# Patient Record
Sex: MALE | Race: WHITE | NOT HISPANIC OR LATINO | Employment: FULL TIME | URBAN - METROPOLITAN AREA
[De-identification: names, ages, dates, MRNs, and addresses within clinical notes are randomized per-mention and may not be internally consistent; named-entity substitution may affect disease eponyms.]

---

## 2019-05-15 ENCOUNTER — HOSPITAL ENCOUNTER (EMERGENCY)
Facility: HOSPITAL | Age: 39
Discharge: HOME | End: 2019-05-15
Attending: EMERGENCY MEDICINE
Payer: COMMERCIAL

## 2019-05-15 VITALS
WEIGHT: 185 LBS | DIASTOLIC BLOOD PRESSURE: 70 MMHG | TEMPERATURE: 98 F | HEART RATE: 75 BPM | BODY MASS INDEX: 28.04 KG/M2 | HEIGHT: 68 IN | RESPIRATION RATE: 20 BRPM | OXYGEN SATURATION: 99 % | SYSTOLIC BLOOD PRESSURE: 113 MMHG

## 2019-05-15 DIAGNOSIS — N20.0 RENAL STONE: Primary | ICD-10-CM

## 2019-05-15 LAB
ANION GAP SERPL CALC-SCNC: 15 MMOL/L (ref 8–16)
BASOPHILS # BLD AUTO: 0.01 K/UL (ref 0–0.2)
BASOPHILS NFR BLD: 0.2 % (ref 0–1.9)
BILIRUB UR QL STRIP: NEGATIVE
BUN SERPL-MCNC: 24 MG/DL (ref 6–30)
CHLORIDE SERPL-SCNC: 107 MMOL/L (ref 95–110)
CLARITY UR: CLEAR
COLOR UR: YELLOW
CREAT SERPL-MCNC: 1 MG/DL (ref 0.5–1.4)
DIFFERENTIAL METHOD: ABNORMAL
EOSINOPHIL # BLD AUTO: 0.1 K/UL (ref 0–0.5)
EOSINOPHIL NFR BLD: 1.8 % (ref 0–8)
ERYTHROCYTE [DISTWIDTH] IN BLOOD BY AUTOMATED COUNT: 12.3 % (ref 11.5–14.5)
GLUCOSE SERPL-MCNC: 114 MG/DL (ref 70–110)
GLUCOSE UR QL STRIP: NEGATIVE
HCT VFR BLD AUTO: 40.9 % (ref 40–54)
HCT VFR BLD CALC: 35 %PCV (ref 36–54)
HGB BLD-MCNC: 13.6 G/DL (ref 14–18)
HGB UR QL STRIP: NEGATIVE
KETONES UR QL STRIP: NEGATIVE
LEUKOCYTE ESTERASE UR QL STRIP: NEGATIVE
LYMPHOCYTES # BLD AUTO: 1.7 K/UL (ref 1–4.8)
LYMPHOCYTES NFR BLD: 35.2 % (ref 18–48)
MCH RBC QN AUTO: 30.3 PG (ref 27–31)
MCHC RBC AUTO-ENTMCNC: 33.3 G/DL (ref 32–36)
MCV RBC AUTO: 91 FL (ref 82–98)
MONOCYTES # BLD AUTO: 0.4 K/UL (ref 0.3–1)
MONOCYTES NFR BLD: 7.7 % (ref 4–15)
NEUTROPHILS # BLD AUTO: 2.7 K/UL (ref 1.8–7.7)
NEUTROPHILS NFR BLD: 55.1 % (ref 38–73)
NITRITE UR QL STRIP: NEGATIVE
PH UR STRIP: 5 [PH] (ref 5–8)
PLATELET # BLD AUTO: 204 K/UL (ref 150–350)
PMV BLD AUTO: 10.7 FL (ref 9.2–12.9)
POC IONIZED CALCIUM: 1.25 MMOL/L (ref 1.06–1.42)
POC TCO2 (MEASURED): 23 MMOL/L (ref 23–29)
POTASSIUM BLD-SCNC: 4.5 MMOL/L (ref 3.5–5.1)
PROT UR QL STRIP: NEGATIVE
RBC # BLD AUTO: 4.49 M/UL (ref 4.6–6.2)
SAMPLE: ABNORMAL
SODIUM BLD-SCNC: 140 MMOL/L (ref 136–145)
SP GR UR STRIP: 1.02 (ref 1–1.03)
URN SPEC COLLECT METH UR: NORMAL
UROBILINOGEN UR STRIP-ACNC: NEGATIVE EU/DL
WBC # BLD AUTO: 4.94 K/UL (ref 3.9–12.7)

## 2019-05-15 PROCEDURE — 82565 ASSAY OF CREATININE: CPT

## 2019-05-15 PROCEDURE — 96376 TX/PRO/DX INJ SAME DRUG ADON: CPT

## 2019-05-15 PROCEDURE — 85014 HEMATOCRIT: CPT

## 2019-05-15 PROCEDURE — 81003 URINALYSIS AUTO W/O SCOPE: CPT

## 2019-05-15 PROCEDURE — 25000003 PHARM REV CODE 250: Performed by: EMERGENCY MEDICINE

## 2019-05-15 PROCEDURE — 96374 THER/PROPH/DIAG INJ IV PUSH: CPT

## 2019-05-15 PROCEDURE — 85025 COMPLETE CBC W/AUTO DIFF WBC: CPT

## 2019-05-15 PROCEDURE — 96375 TX/PRO/DX INJ NEW DRUG ADDON: CPT

## 2019-05-15 PROCEDURE — 84295 ASSAY OF SERUM SODIUM: CPT

## 2019-05-15 PROCEDURE — 96361 HYDRATE IV INFUSION ADD-ON: CPT

## 2019-05-15 PROCEDURE — 99900035 HC TECH TIME PER 15 MIN (STAT)

## 2019-05-15 PROCEDURE — 82330 ASSAY OF CALCIUM: CPT

## 2019-05-15 PROCEDURE — 82962 GLUCOSE BLOOD TEST: CPT

## 2019-05-15 PROCEDURE — 84132 ASSAY OF SERUM POTASSIUM: CPT

## 2019-05-15 PROCEDURE — 63600175 PHARM REV CODE 636 W HCPCS: Performed by: EMERGENCY MEDICINE

## 2019-05-15 PROCEDURE — 99284 EMERGENCY DEPT VISIT MOD MDM: CPT | Mod: 25

## 2019-05-15 RX ORDER — TAMSULOSIN HYDROCHLORIDE 0.4 MG/1
0.4 CAPSULE ORAL DAILY
Qty: 30 CAPSULE | Refills: 0 | Status: SHIPPED | OUTPATIENT
Start: 2019-05-15 | End: 2019-06-06 | Stop reason: ALTCHOICE

## 2019-05-15 RX ORDER — HYDROMORPHONE HYDROCHLORIDE 2 MG/ML
1 INJECTION, SOLUTION INTRAMUSCULAR; INTRAVENOUS; SUBCUTANEOUS
Status: COMPLETED | OUTPATIENT
Start: 2019-05-15 | End: 2019-05-15

## 2019-05-15 RX ORDER — OXYCODONE AND ACETAMINOPHEN 5; 325 MG/1; MG/1
1 TABLET ORAL EVERY 4 HOURS PRN
Qty: 18 TABLET | Refills: 0 | Status: SHIPPED | OUTPATIENT
Start: 2019-05-15 | End: 2019-06-06 | Stop reason: ALTCHOICE

## 2019-05-15 RX ORDER — ONDANSETRON 2 MG/ML
4 INJECTION INTRAMUSCULAR; INTRAVENOUS
Status: COMPLETED | OUTPATIENT
Start: 2019-05-15 | End: 2019-05-15

## 2019-05-15 RX ORDER — MELOXICAM 15 MG/1
15 TABLET ORAL DAILY
Qty: 30 TABLET | Refills: 0 | Status: SHIPPED | OUTPATIENT
Start: 2019-05-15 | End: 2019-06-06 | Stop reason: ALTCHOICE

## 2019-05-15 RX ORDER — HYDROMORPHONE HYDROCHLORIDE 2 MG/ML
0.5 INJECTION, SOLUTION INTRAMUSCULAR; INTRAVENOUS; SUBCUTANEOUS
Status: COMPLETED | OUTPATIENT
Start: 2019-05-15 | End: 2019-05-15

## 2019-05-15 RX ORDER — ONDANSETRON 4 MG/1
4 TABLET, FILM COATED ORAL EVERY 6 HOURS
Qty: 12 TABLET | Refills: 0 | Status: SHIPPED | OUTPATIENT
Start: 2019-05-15 | End: 2019-06-06 | Stop reason: ALTCHOICE

## 2019-05-15 RX ORDER — OXYCODONE AND ACETAMINOPHEN 5; 325 MG/1; MG/1
1 TABLET ORAL
Status: COMPLETED | OUTPATIENT
Start: 2019-05-15 | End: 2019-05-15

## 2019-05-15 RX ORDER — KETOROLAC TROMETHAMINE 30 MG/ML
15 INJECTION, SOLUTION INTRAMUSCULAR; INTRAVENOUS
Status: COMPLETED | OUTPATIENT
Start: 2019-05-15 | End: 2019-05-15

## 2019-05-15 RX ADMIN — OXYCODONE HYDROCHLORIDE AND ACETAMINOPHEN 1 TABLET: 5; 325 TABLET ORAL at 08:05

## 2019-05-15 RX ADMIN — ONDANSETRON 4 MG: 2 INJECTION INTRAMUSCULAR; INTRAVENOUS at 06:05

## 2019-05-15 RX ADMIN — HYDROMORPHONE HYDROCHLORIDE 0.5 MG: 2 INJECTION, SOLUTION INTRAMUSCULAR; INTRAVENOUS; SUBCUTANEOUS at 12:05

## 2019-05-15 RX ADMIN — SODIUM CHLORIDE 1000 ML: 0.9 INJECTION, SOLUTION INTRAVENOUS at 06:05

## 2019-05-15 RX ADMIN — HYDROMORPHONE HYDROCHLORIDE 1 MG: 2 INJECTION, SOLUTION INTRAMUSCULAR; INTRAVENOUS; SUBCUTANEOUS at 06:05

## 2019-05-15 RX ADMIN — KETOROLAC TROMETHAMINE 15 MG: 30 INJECTION, SOLUTION INTRAMUSCULAR at 06:05

## 2019-05-15 NOTE — DISCHARGE INSTRUCTIONS
Thank you for coming to our Emergency Department today. It is important to remember that some problems are difficult to diagnose and may not be found during your first visit. Be sure to follow up with your primary care doctor and review any labs/imaging that was performed with them. If you do not have a primary care doctor, you may contact the one listed on your discharge paperwork or you may also call the [facility] Appointment Desk at [phone] to schedule an appointment with one.     Return to the ER with any questions/concerns, new/concerning symptoms, worsening or failure to improve. Do not drive or make any important decisions for 24 hours if you have received any pain medications, sedatives or mood altering drugs during your ER visit.

## 2019-05-15 NOTE — ED PROVIDER NOTES
Encounter Date: 5/15/2019       History     Chief Complaint   Patient presents with    Flank Pain     Pt here with c/o left side flank pain since 2am. Pt reports he took pain medication at 5am with no relief. Pt reports hx of kidney stones.     38 y.o. male Past Medical History:  No date: Kidney stones  No date: Skin cancer     Renal stones x 3, always pass on their own, notes awakened from sleep with severe L flank pain rad to abdomen. Denies n/v, diarrhea/dysuria/hematuria        Review of patient's allergies indicates:   Allergen Reactions    Penicillins      Burning with urination after taken penicillin     Past Medical History:   Diagnosis Date    Kidney stones     Skin cancer      Past Surgical History:   Procedure Laterality Date    skin cancer surgery       No family history on file.  Social History     Tobacco Use    Smoking status: Current Some Day Smoker     Types: Cigars   Substance Use Topics    Alcohol use: Yes    Drug use: Not on file     Review of Systems   Constitutional: Negative for fever.   HENT: Negative for sore throat.    Respiratory: Negative for shortness of breath.    Cardiovascular: Negative for chest pain.   Gastrointestinal: Negative for nausea.   Genitourinary: Negative for dysuria.   Musculoskeletal: Negative for back pain.   Skin: Negative for rash.   Neurological: Negative for weakness.   Hematological: Does not bruise/bleed easily.   All other systems reviewed and are negative.      Physical Exam     Initial Vitals [05/15/19 0626]   BP Pulse Resp Temp SpO2   (!) 163/89 79 18 97.5 °F (36.4 °C) 98 %      MAP       --         Physical Exam    Nursing note and vitals reviewed.  Constitutional: He appears well-developed and well-nourished.   HENT:   Head: Normocephalic and atraumatic.   Eyes: EOM are normal. Pupils are equal, round, and reactive to light.   Cardiovascular: Normal rate, regular rhythm and normal heart sounds.   Pulmonary/Chest: Effort normal and breath sounds  normal.   Abdominal: Soft. He exhibits no distension. There is no tenderness. There is no rebound.   Musculoskeletal: He exhibits no edema or tenderness.   Neurological: He is alert and oriented to person, place, and time. No cranial nerve deficit.   Skin: Skin is warm and dry.   Psychiatric: He has a normal mood and affect.     no cva ttp    ED Course   Procedures  Labs Reviewed   CBC W/ AUTO DIFFERENTIAL   URINALYSIS, REFLEX TO URINE CULTURE   ISTAT CHEM8          Imaging Results    None          Medical Decision Making:   Initial Assessment:   Have ordered screening labs, ua, ct scan and symptomatic treatment.                      Clinical Impression:       ICD-10-CM ICD-9-CM   1. Renal stone N20.0 592.0                                Ryan Ochoa MD  05/15/19 0975

## 2019-05-15 NOTE — ED TRIAGE NOTES
Patient presents to the ER with wife via personal vehicle. Patient presents with left flank pain that radiates to the groin. Hx of kidney stones. States this pain feels the same as his other kidney stones. Pain started around 230 this morning. Reports able to urinate. Denies chest pain, SOB, n/v/d.

## 2019-05-15 NOTE — PROGRESS NOTES
Results for REYNALDO GEE (MRN [id number]) as of 5/15/2019 09:55   Ref. Range 5/15/2019 08:06   POC Glucose Latest Ref Range: 70 - 110 mg/dL 114 (H)   POC Creatinine Latest Ref Range: 0.5 - 1.4 mg/dL 1.0   POC BUN Latest Ref Range: 6 - 30 mg/dL 24   POC Chloride Latest Ref Range: 95 - 110 mmol/L 107   POC Sodium Latest Ref Range: 136 - 145 mmol/L 140   POC Potassium Latest Ref Range: 3.5 - 5.1 mmol/L 4.5   POC Ionized Calcium Latest Ref Range: 1.06 - 1.42 mmol/L 1.25   POC Hematocrit Latest Ref Range: 36 - 54 %PCV 35 (L)   POC TCO2 (MEASURED) Latest Ref Range: 23 - 29 mmol/L 23   Sample Unknown VENOUS   POC Anion Gap Latest Ref Range: 8 - 16 mmol/L 15

## 2019-05-16 ENCOUNTER — OFFICE VISIT (OUTPATIENT)
Dept: UROLOGY | Facility: CLINIC | Age: 39
End: 2019-05-16
Payer: COMMERCIAL

## 2019-05-16 VITALS
DIASTOLIC BLOOD PRESSURE: 70 MMHG | SYSTOLIC BLOOD PRESSURE: 112 MMHG | BODY MASS INDEX: 28.74 KG/M2 | HEIGHT: 68 IN | HEART RATE: 68 BPM | WEIGHT: 189.63 LBS | RESPIRATION RATE: 16 BRPM

## 2019-05-16 DIAGNOSIS — N13.2 HYDRONEPHROSIS WITH URINARY OBSTRUCTION DUE TO URETERAL CALCULUS: ICD-10-CM

## 2019-05-16 DIAGNOSIS — N20.0 NEPHROLITHIASIS: ICD-10-CM

## 2019-05-16 DIAGNOSIS — N20.1 LEFT URETERAL CALCULUS: Primary | ICD-10-CM

## 2019-05-16 PROCEDURE — 99203 OFFICE O/P NEW LOW 30 MIN: CPT | Mod: S$GLB,,, | Performed by: UROLOGY

## 2019-05-16 PROCEDURE — 99999 PR PBB SHADOW E&M-EST. PATIENT-LVL III: ICD-10-PCS | Mod: PBBFAC,,, | Performed by: UROLOGY

## 2019-05-16 PROCEDURE — 99999 PR PBB SHADOW E&M-EST. PATIENT-LVL III: CPT | Mod: PBBFAC,,, | Performed by: UROLOGY

## 2019-05-16 PROCEDURE — 99203 PR OFFICE/OUTPT VISIT, NEW, LEVL III, 30-44 MIN: ICD-10-PCS | Mod: S$GLB,,, | Performed by: UROLOGY

## 2019-05-16 PROCEDURE — 3008F PR BODY MASS INDEX (BMI) DOCUMENTED: ICD-10-PCS | Mod: CPTII,S$GLB,, | Performed by: UROLOGY

## 2019-05-16 PROCEDURE — 3008F BODY MASS INDEX DOCD: CPT | Mod: CPTII,S$GLB,, | Performed by: UROLOGY

## 2019-05-16 NOTE — PROGRESS NOTES
"Subjective:       Lucius Kohler is a 38 y.o. male who is a new patient who was referred by Dr Ochoa for evaluation of nephrolithiasis.      He presented to ER yesterday with L flank pain. He has passed 3 stones previously, about every 5 years. Stones passed were up to 7mm in the past. No prior workup.     Pain still present though mild (4/10), radiates to groin. No fevers, dysuria. Nausea/vomiting yesterday, not today.     CT 5/19 - 5mm stone at L UVJ with mild/mod hydronephrosis. 2mm RUP stone, 1mm LMP stone.      The following portions of the patient's history were reviewed and updated as appropriate: allergies, current medications, past family history, past medical history, past social history, past surgical history and problem list.    Review of Systems  Constitutional: no fever or chills  ENT: no nasal congestion or sore throat  Respiratory: no cough or shortness of breath  Cardiovascular: no chest pain or palpitations  Gastrointestinal: tolerating diet  Genitourinary: as per HPI  Hematologic/Lymphatic: no easy bruising or lymphadenopathy  Musculoskeletal: no arthralgias or myalgias  Skin: no rashes or lesions  Neurological: no seizures or tremors  Behavioral/Psych: no auditory or visual hallucinations       Objective:    Vitals: /70   Pulse 68   Resp 16   Ht 5' 8" (1.727 m)   Wt 86 kg (189 lb 9.5 oz)   BMI 28.83 kg/m²     Physical Exam   General: well developed, well nourished in no acute distress  Head: normocephalic, atraumatic  Neck: supple, trachea midline, no obvious enlargement of thyroid  HEENT: EOMI, mucus membranes moist, sclera anicteric, no hearing impairment  Lungs: symmetric expansion, non-labored breathing  Cardiovascular: regular rate and rhythm, normal pulses  Abdomen: soft, non tender, non distended, no palpable masses, no hepatosplenomegaly, no hernias, bladder nonpalpable. No CVA tenderness.  Musculoskeletal: no peripheral edema, normal ROM in bilateral upper and lower " extremities  Lymphatics: no cervical or inguinal lymphadenopathy  Skin: no rashes or lesions  Neuro: alert and oriented x 3, no gross deficits  Psych: normal judgment and insight, normal mood/affect and non-anxious  Genitourinary:   patient declined exam   PREET: patient declined exam      Lab Review   Urine analysis today in clinic shows +blood 250, +glucose     Lab Results   Component Value Date    WBC 4.94 05/15/2019    HGB 13.6 (L) 05/15/2019    HCT 35 (L) 05/15/2019    MCV 91 05/15/2019     05/15/2019     Lab Results   Component Value Date    CREATININE 1.3 08/29/2016    BUN 14 08/29/2016     No results found for: PSA  No results found for: PSADIAG    Imaging  CT reviewed  Reports and images were personally reviewed by me and discussed with the patient today       Assessment/Plan:      1. Left ureteral calculus    - 5mm L UVJ   - Trial of MET   - Discussed red flags to go to ER: fever, inability to tolerate PO, pain not controlled by medications.   - Discussed possible treatment options   - Strain urine   - Discussed stone prevention. Collect stone for analysis.   - KARMA 2 weeks     2. Hydronephrosis with urinary obstruction due to ureteral calculus    - 5mm L UVJ stone     3. Nephrolithiasis    - Also with 1-2mm stone in each kidney       Follow up in 2-3 weeks with KARMA

## 2019-05-30 ENCOUNTER — HOSPITAL ENCOUNTER (OUTPATIENT)
Dept: RADIOLOGY | Facility: HOSPITAL | Age: 39
Discharge: HOME | End: 2019-05-30
Attending: UROLOGY
Payer: COMMERCIAL

## 2019-05-30 DIAGNOSIS — N13.2 HYDRONEPHROSIS WITH URINARY OBSTRUCTION DUE TO URETERAL CALCULUS: ICD-10-CM

## 2019-05-30 DIAGNOSIS — N20.1 LEFT URETERAL CALCULUS: ICD-10-CM

## 2019-05-30 DIAGNOSIS — N20.0 NEPHROLITHIASIS: ICD-10-CM

## 2019-05-30 PROCEDURE — 76770 US EXAM ABDO BACK WALL COMP: CPT | Mod: 26,,, | Performed by: RADIOLOGY

## 2019-05-30 PROCEDURE — 76770 US EXAM ABDO BACK WALL COMP: CPT | Mod: TC

## 2019-05-30 PROCEDURE — 76770 US RETROPERITONEAL COMPLETE: ICD-10-PCS | Mod: 26,,, | Performed by: RADIOLOGY

## 2019-06-06 ENCOUNTER — OFFICE VISIT (OUTPATIENT)
Dept: UROLOGY | Facility: CLINIC | Age: 39
End: 2019-06-06
Payer: COMMERCIAL

## 2019-06-06 VITALS
BODY MASS INDEX: 28.64 KG/M2 | SYSTOLIC BLOOD PRESSURE: 120 MMHG | HEIGHT: 68 IN | WEIGHT: 189 LBS | DIASTOLIC BLOOD PRESSURE: 70 MMHG

## 2019-06-06 DIAGNOSIS — N20.1 LEFT URETERAL CALCULUS: ICD-10-CM

## 2019-06-06 DIAGNOSIS — N13.2 HYDRONEPHROSIS WITH URINARY OBSTRUCTION DUE TO URETERAL CALCULUS: ICD-10-CM

## 2019-06-06 DIAGNOSIS — R10.31 RIGHT GROIN PAIN: Primary | ICD-10-CM

## 2019-06-06 DIAGNOSIS — N20.0 NEPHROLITHIASIS: ICD-10-CM

## 2019-06-06 PROCEDURE — 3008F PR BODY MASS INDEX (BMI) DOCUMENTED: ICD-10-PCS | Mod: CPTII,S$GLB,, | Performed by: UROLOGY

## 2019-06-06 PROCEDURE — 99214 OFFICE O/P EST MOD 30 MIN: CPT | Mod: S$GLB,,, | Performed by: UROLOGY

## 2019-06-06 PROCEDURE — 99214 PR OFFICE/OUTPT VISIT, EST, LEVL IV, 30-39 MIN: ICD-10-PCS | Mod: S$GLB,,, | Performed by: UROLOGY

## 2019-06-06 PROCEDURE — 99999 PR PBB SHADOW E&M-EST. PATIENT-LVL III: CPT | Mod: PBBFAC,,, | Performed by: UROLOGY

## 2019-06-06 PROCEDURE — 3008F BODY MASS INDEX DOCD: CPT | Mod: CPTII,S$GLB,, | Performed by: UROLOGY

## 2019-06-06 PROCEDURE — 99999 PR PBB SHADOW E&M-EST. PATIENT-LVL III: ICD-10-PCS | Mod: PBBFAC,,, | Performed by: UROLOGY

## 2019-06-06 NOTE — PROGRESS NOTES
"Subjective:       Lucius Kohler is a 38 y.o. male who is an established patient who was referred by Dr Ochoa for evaluation of nephrolithiasis.      He presented to ER yesterday with L flank pain. He has passed 3 stones previously, about every 5 years. Stones passed were up to 7mm in the past. No prior workup.     Pain still present though mild (4/10), radiates to groin. No fevers, dysuria. Nausea/vomiting yesterday, not today.     CT 5/19 - 5mm stone at L UVJ with mild/mod hydronephrosis. 2mm RUP stone, 1mm LMP stone.    6/6/2019  Still with pain in R groin, worse with lifting something heavy but always present. 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 He did not note passing the stone but not further L flank pain. Groin pain is contralateral to the side of the stone.     KARMA 5/19 - L hydronephrosis resolved      The following portions of the patient's history were reviewed and updated as appropriate: allergies, current medications, past family history, past medical history, past social history, past surgical history and problem list.    Review of Systems  Constitutional: no fever or chills  ENT: no nasal congestion or sore throat  Respiratory: no cough or shortness of breath  Cardiovascular: no chest pain or palpitations  Gastrointestinal: tolerating diet  Genitourinary: as per HPI  Hematologic/Lymphatic: no easy bruising or lymphadenopathy  Musculoskeletal: no arthralgias or myalgias  Skin: no rashes or lesions  Neurological: no seizures or tremors  Behavioral/Psych: no auditory or visual hallucinations       Objective:    Vitals: /70   Ht 5' 8" (1.727 m)   Wt 85.7 kg (189 lb)   BMI 28.74 kg/m²     Physical Exam   General: well developed, well nourished in no acute distress  Head: normocephalic, atraumatic  Neck: supple, trachea midline, no obvious enlargement of thyroid  HEENT: EOMI, mucus membranes moist, sclera anicteric, no hearing impairment  Lungs: symmetric expansion, non-labored " breathing  Cardiovascular: regular rate and rhythm, normal pulses  Abdomen: soft, non tender, non distended, no palpable masses, no hepatosplenomegaly, no hernias, bladder nonpalpable. No CVA tenderness.  Musculoskeletal: no peripheral edema, normal ROM in bilateral upper and lower extremities  Lymphatics: no cervical or inguinal lymphadenopathy  Skin: no rashes or lesions  Neuro: alert and oriented x 3, no gross deficits  Psych: normal judgment and insight, normal mood/affect and non-anxious  Genitourinary:   patient declined exam   PREET: patient declined exam      Lab Review   Urine analysis today in clinic shows - negative    Lab Results   Component Value Date    WBC 4.94 05/15/2019    HGB 13.6 (L) 05/15/2019    HCT 35 (L) 05/15/2019    MCV 91 05/15/2019     05/15/2019     Lab Results   Component Value Date    CREATININE 1.3 08/29/2016    BUN 14 08/29/2016     No results found for: PSA  No results found for: PSADIAG    Imaging  CT reviewed  Reports and images were personally reviewed by me and discussed with the patient today       Assessment/Plan:      1. Left ureteral calculus    - 5mm L UVJ   - Trial of MET   - Discussed red flags to go to ER: fever, inability to tolerate PO, pain not controlled by medications.   - Discussed possible treatment options   - Strain urine   - Discussed stone prevention. Collect stone for analysis.   - KARMA - L hydro resolved   - Recheck in 1 year - KARMA/KUB     2. Hydronephrosis with urinary obstruction due to ureteral calculus    - 5mm L UVJ stone - seems to have passed     3. Nephrolithiasis    - Also with 1-2mm stone in each kidney     4. R groin pain   - Contralateral to side of stone   - Worsened by heavy lifting   - Concern for hernia. Not obvious on my exam   - He would like to see general surgery for evaluation    Follow up in 12 months with KUB/KARMA

## 2019-06-19 ENCOUNTER — OFFICE VISIT (OUTPATIENT)
Dept: SURGERY | Facility: CLINIC | Age: 39
End: 2019-06-19
Payer: COMMERCIAL

## 2019-06-19 VITALS
HEIGHT: 68 IN | HEART RATE: 80 BPM | SYSTOLIC BLOOD PRESSURE: 110 MMHG | BODY MASS INDEX: 28.63 KG/M2 | WEIGHT: 188.94 LBS | DIASTOLIC BLOOD PRESSURE: 77 MMHG

## 2019-06-19 DIAGNOSIS — K40.20 NON-RECURRENT BILATERAL INGUINAL HERNIA WITHOUT OBSTRUCTION OR GANGRENE: Primary | ICD-10-CM

## 2019-06-19 PROCEDURE — 3008F BODY MASS INDEX DOCD: CPT | Mod: CPTII,S$GLB,, | Performed by: SURGERY

## 2019-06-19 PROCEDURE — 99204 OFFICE O/P NEW MOD 45 MIN: CPT | Mod: S$GLB,,, | Performed by: SURGERY

## 2019-06-19 PROCEDURE — 3008F PR BODY MASS INDEX (BMI) DOCUMENTED: ICD-10-PCS | Mod: CPTII,S$GLB,, | Performed by: SURGERY

## 2019-06-19 PROCEDURE — 99204 PR OFFICE/OUTPT VISIT, NEW, LEVL IV, 45-59 MIN: ICD-10-PCS | Mod: S$GLB,,, | Performed by: SURGERY

## 2019-06-19 RX ORDER — LIDOCAINE HYDROCHLORIDE 10 MG/ML
1 INJECTION, SOLUTION EPIDURAL; INFILTRATION; INTRACAUDAL; PERINEURAL ONCE
Status: DISCONTINUED | OUTPATIENT
Start: 2019-06-19 | End: 2019-07-22 | Stop reason: HOSPADM

## 2019-06-19 RX ORDER — SODIUM CHLORIDE 9 MG/ML
INJECTION, SOLUTION INTRAVENOUS CONTINUOUS
Status: CANCELLED | OUTPATIENT
Start: 2019-06-19

## 2019-06-19 NOTE — PROGRESS NOTES
"History & Physical    SUBJECTIVE:     History of Present Illness:  Patient is a 38 y.o. male presents with left inguinal hernia without obstruction.    Chief Complaint   Patient presents with    Consult       Review of patient's allergies indicates:   Allergen Reactions    Penicillins      Burning with urination after taken penicillin       No current outpatient medications on file.     No current facility-administered medications for this visit.        Past Medical History:   Diagnosis Date    Kidney stones     Skin cancer      Past Surgical History:   Procedure Laterality Date    skin cancer surgery      WISDOM TOOTH EXTRACTION       History reviewed. No pertinent family history.  Social History     Tobacco Use    Smoking status: Former Smoker     Types: Cigars   Substance Use Topics    Alcohol use: Yes    Drug use: Not on file        Review of Systems:  Review of Systems   Constitutional: Negative.    HENT: Negative.    Eyes: Negative.    Respiratory: Negative.    Cardiovascular: Negative.    Gastrointestinal: Negative.    Endocrine: Negative.    Musculoskeletal: Negative.    Skin: Negative.    Allergic/Immunologic: Negative.    Neurological: Negative.    Hematological: Negative.    Psychiatric/Behavioral: Negative.    All other systems reviewed and are negative.      OBJECTIVE:     Vital Signs (Most Recent)  Pulse: 80 (06/19/19 1438)  BP: 110/77 (06/19/19 1438)  5' 8" (1.727 m)  85.7 kg (188 lb 15 oz)     Physical Exam:  Physical Exam   Constitutional: He is oriented to person, place, and time. He appears well-developed and well-nourished.   HENT:   Head: Normocephalic and atraumatic.   Right Ear: External ear normal.   Left Ear: External ear normal.   Nose: Nose normal.   Mouth/Throat: Oropharynx is clear and moist.   Eyes: Pupils are equal, round, and reactive to light. Conjunctivae and EOM are normal.   Neck: Normal range of motion. Neck supple.   Cardiovascular: Normal rate, regular rhythm, normal " heart sounds and intact distal pulses.   Pulmonary/Chest: Effort normal and breath sounds normal.   Abdominal: Soft. Bowel sounds are normal. A hernia is present. Hernia confirmed positive in the left inguinal area.   Genitourinary:         Musculoskeletal: Normal range of motion.   Neurological: He is alert and oriented to person, place, and time. He has normal reflexes.   Skin: Skin is warm and dry.   Psychiatric: He has a normal mood and affect. His behavior is normal. Thought content normal.   Vitals reviewed.      Laboratory  none    Diagnostic Results:  none    ASSESSMENT/PLAN:     Bilateral inguinal hernia     PLAN:Plan     I will take him to the OR for bilateral inguinal hernia repair with risks and possible complications explained

## 2019-06-19 NOTE — LETTER
June 19, 2019      Eric Lantigua MD  [address] [facility] [address] [facility]  [place] [address]           [facility]  [address] [facility] [address], [address]  [place] [address]  Phone: [phone]  Fax: [phone]          Patient: Lucius Kohler   MR Number: [id number]   YOB: 1980   Date of Visit: 6/19/2019       Dear Dr. Eric Lantigua:    Thank you for referring Lucius Kohler to me for evaluation. Attached you will find relevant portions of my assessment and plan of care.    If you have questions, please do not hesitate to call me. I look forward to following Lucius Kohler along with you.    Sincerely,    Ortega Mckinnon MD    Enclosure  CC:  No Recipients    If you would like to receive this communication electronically, please contact [email or web address] or [phone] to request more information on [facility] Link access.    For providers and/or their staff who would like to refer a patient to [facility], please contact us through our one-stop-shop provider referral line, [facility], at [phone].    If you feel you have received this communication in error or would no longer like to receive these types of communications, please e-mail [email or web address]

## 2019-07-18 ENCOUNTER — HOSPITAL ENCOUNTER (OUTPATIENT)
Dept: PREADMISSION TESTING | Facility: HOSPITAL | Age: 39
Discharge: HOME | End: 2019-07-18
Attending: SURGERY
Payer: COMMERCIAL

## 2019-07-18 VITALS
SYSTOLIC BLOOD PRESSURE: 113 MMHG | HEIGHT: 68 IN | BODY MASS INDEX: 29.51 KG/M2 | OXYGEN SATURATION: 97 % | HEART RATE: 74 BPM | TEMPERATURE: 98 F | WEIGHT: 194.69 LBS | RESPIRATION RATE: 16 BRPM | DIASTOLIC BLOOD PRESSURE: 77 MMHG

## 2019-07-18 DIAGNOSIS — K40.20 NON-RECURRENT BILATERAL INGUINAL HERNIA WITHOUT OBSTRUCTION OR GANGRENE: ICD-10-CM

## 2019-07-18 LAB
ALBUMIN SERPL BCP-MCNC: 4.4 G/DL (ref 3.5–5.2)
ALP SERPL-CCNC: 72 U/L (ref 55–135)
ALT SERPL W/O P-5'-P-CCNC: 22 U/L (ref 10–44)
ANION GAP SERPL CALC-SCNC: 8 MMOL/L (ref 8–16)
AST SERPL-CCNC: 16 U/L (ref 10–40)
BASOPHILS # BLD AUTO: 0.02 K/UL (ref 0–0.2)
BASOPHILS NFR BLD: 0.3 % (ref 0–1.9)
BILIRUB SERPL-MCNC: 0.2 MG/DL (ref 0.1–1)
BUN SERPL-MCNC: 21 MG/DL (ref 6–20)
CALCIUM SERPL-MCNC: 9.4 MG/DL (ref 8.7–10.5)
CHLORIDE SERPL-SCNC: 105 MMOL/L (ref 95–110)
CO2 SERPL-SCNC: 25 MMOL/L (ref 23–29)
CREAT SERPL-MCNC: 1.2 MG/DL (ref 0.5–1.4)
DIFFERENTIAL METHOD: ABNORMAL
EOSINOPHIL # BLD AUTO: 0.2 K/UL (ref 0–0.5)
EOSINOPHIL NFR BLD: 2.2 % (ref 0–8)
ERYTHROCYTE [DISTWIDTH] IN BLOOD BY AUTOMATED COUNT: 12.4 % (ref 11.5–14.5)
EST. GFR  (AFRICAN AMERICAN): >60 ML/MIN/1.73 M^2
EST. GFR  (NON AFRICAN AMERICAN): >60 ML/MIN/1.73 M^2
GLUCOSE SERPL-MCNC: 91 MG/DL (ref 70–110)
HCT VFR BLD AUTO: 40.4 % (ref 40–54)
HGB BLD-MCNC: 13.4 G/DL (ref 14–18)
LYMPHOCYTES # BLD AUTO: 3 K/UL (ref 1–4.8)
LYMPHOCYTES NFR BLD: 43.2 % (ref 18–48)
MCH RBC QN AUTO: 29.7 PG (ref 27–31)
MCHC RBC AUTO-ENTMCNC: 33.2 G/DL (ref 32–36)
MCV RBC AUTO: 90 FL (ref 82–98)
MONOCYTES # BLD AUTO: 0.4 K/UL (ref 0.3–1)
MONOCYTES NFR BLD: 5.8 % (ref 4–15)
NEUTROPHILS # BLD AUTO: 3.4 K/UL (ref 1.8–7.7)
NEUTROPHILS NFR BLD: 48.6 % (ref 38–73)
PLATELET # BLD AUTO: 218 K/UL (ref 150–350)
PMV BLD AUTO: 10.5 FL (ref 9.2–12.9)
POTASSIUM SERPL-SCNC: 4.2 MMOL/L (ref 3.5–5.1)
PROT SERPL-MCNC: 7.5 G/DL (ref 6–8.4)
RBC # BLD AUTO: 4.51 M/UL (ref 4.6–6.2)
SODIUM SERPL-SCNC: 138 MMOL/L (ref 136–145)
WBC # BLD AUTO: 6.95 K/UL (ref 3.9–12.7)

## 2019-07-18 PROCEDURE — 85025 COMPLETE CBC W/AUTO DIFF WBC: CPT

## 2019-07-18 PROCEDURE — 36415 COLL VENOUS BLD VENIPUNCTURE: CPT

## 2019-07-18 PROCEDURE — 80053 COMPREHEN METABOLIC PANEL: CPT

## 2019-07-18 NOTE — DISCHARGE INSTRUCTIONS
Your surgery is scheduled for _Monday July 22, 2019___________________.    Call [phone] between 2 p.m. and 5 p.m. on   _Friday__ to find out your arrival time for the day of your surgery.      Please report to SAME DAY SURGERY UNIT on the 2nd FLOOR at _______ a.m.  Use front door entrance. The doors open at 0530 am.          INSTRUCTIONS IMPORTANT!!!  ¨ Do not eat or drink after 12 midnight-including water. OK to brush teeth, no   gum, candy or mints!    DO NOT WEAR CONTACTS DAY OF SURGERY.        _X___  Prep instructions: ENEMA   SHOWER   OTHER  ______________________  _X___  Please shower using Hibiclens soap the night before AND  the morning of  your surgery/procedure. Do not use Hibiclens on your face or genitals                Rinse hibiclens off completely.  _X___  No shaving of procedural area at least 4-5 days before surgery due to increased risk of skin irritation and/or possible infection.  ____  Do not wear makeup, including mascara. WEARING EYE MAKEUP MAY   LEAD TO SERIOUS EYE INJURY during surgery.  __X__  No powder, lotions or creams to your GROIN..  _X___  You may wear only deodorant on the day of surgery.  _X___  Please remove all jewelry, including piercings and leave at home.  _X___  No money or valuables needed. Please leave at home.  You may bring your  cell phone.  ____  Please bring any documents given by your doctor.  _X___  If going home the same day, arrange for a ride home. You will not be able to drive if Anesthesia was used.    _X__  Wear loose fitting clothing. Allow for dressings, bandages.  _X___  Stop Aspirin, Ibuprofen, Motrin and Aleve at least 3-5 days before  surgery, unless otherwise instructed by your doctor, or the nurse.              You MAY use Tylenol/acetaminophen until day of surgery.  _X___  Call MD for temperature above 101 degrees.        _X___ Stop taking any Fish Oil supplement or any Vitamins that contain Vitamin   E at least 5 days prior to surgery.               I have read or had read and explained to me, and understand the above information.  Additional comments or instructions:Please call   [phone] if you have any questions regarding the instructions above.

## 2019-07-21 ENCOUNTER — ANESTHESIA EVENT (OUTPATIENT)
Dept: SURGERY | Facility: HOSPITAL | Age: 39
End: 2019-07-21
Payer: COMMERCIAL

## 2019-07-22 ENCOUNTER — ANESTHESIA (OUTPATIENT)
Dept: SURGERY | Facility: HOSPITAL | Age: 39
End: 2019-07-22
Payer: COMMERCIAL

## 2019-07-22 ENCOUNTER — HOSPITAL ENCOUNTER (OUTPATIENT)
Facility: HOSPITAL | Age: 39
Discharge: HOME | End: 2019-07-22
Attending: SURGERY | Admitting: SURGERY
Payer: COMMERCIAL

## 2019-07-22 VITALS
HEART RATE: 85 BPM | WEIGHT: 194.69 LBS | TEMPERATURE: 99 F | DIASTOLIC BLOOD PRESSURE: 67 MMHG | HEIGHT: 68 IN | BODY MASS INDEX: 29.51 KG/M2 | RESPIRATION RATE: 18 BRPM | SYSTOLIC BLOOD PRESSURE: 116 MMHG | OXYGEN SATURATION: 96 %

## 2019-07-22 DIAGNOSIS — K40.20 NON-RECURRENT BILATERAL INGUINAL HERNIA WITHOUT OBSTRUCTION OR GANGRENE: Primary | ICD-10-CM

## 2019-07-22 LAB — POCT GLUCOSE: 93 MG/DL (ref 70–110)

## 2019-07-22 PROCEDURE — 27200651 HC AIRWAY, LMA: Performed by: NURSE ANESTHETIST, CERTIFIED REGISTERED

## 2019-07-22 PROCEDURE — D9220A PRA ANESTHESIA: Mod: CRNA,,, | Performed by: NURSE ANESTHETIST, CERTIFIED REGISTERED

## 2019-07-22 PROCEDURE — D9220A PRA ANESTHESIA: ICD-10-PCS | Mod: ANES,,, | Performed by: ANESTHESIOLOGY

## 2019-07-22 PROCEDURE — D9220A PRA ANESTHESIA: ICD-10-PCS | Mod: CRNA,,, | Performed by: NURSE ANESTHETIST, CERTIFIED REGISTERED

## 2019-07-22 PROCEDURE — 25000003 PHARM REV CODE 250: Performed by: SURGERY

## 2019-07-22 PROCEDURE — 88302 TISSUE EXAM BY PATHOLOGIST: CPT | Performed by: PATHOLOGY

## 2019-07-22 PROCEDURE — C1781 MESH (IMPLANTABLE): HCPCS | Performed by: SURGERY

## 2019-07-22 PROCEDURE — S0020 INJECTION, BUPIVICAINE HYDRO: HCPCS | Performed by: SURGERY

## 2019-07-22 PROCEDURE — D9220A PRA ANESTHESIA: Mod: ANES,,, | Performed by: ANESTHESIOLOGY

## 2019-07-22 PROCEDURE — 49505 PRP I/HERN INIT REDUC >5 YR: CPT | Mod: 50,,, | Performed by: SURGERY

## 2019-07-22 PROCEDURE — 37000008 HC ANESTHESIA 1ST 15 MINUTES: Performed by: SURGERY

## 2019-07-22 PROCEDURE — 25000003 PHARM REV CODE 250: Performed by: NURSE ANESTHETIST, CERTIFIED REGISTERED

## 2019-07-22 PROCEDURE — 63600175 PHARM REV CODE 636 W HCPCS: Performed by: SURGERY

## 2019-07-22 PROCEDURE — 71000015 HC POSTOP RECOV 1ST HR: Performed by: SURGERY

## 2019-07-22 PROCEDURE — C9290 INJ, BUPIVACAINE LIPOSOME: HCPCS | Performed by: SURGERY

## 2019-07-22 PROCEDURE — 71000016 HC POSTOP RECOV ADDL HR: Performed by: SURGERY

## 2019-07-22 PROCEDURE — 63600175 PHARM REV CODE 636 W HCPCS: Performed by: NURSE ANESTHETIST, CERTIFIED REGISTERED

## 2019-07-22 PROCEDURE — 88302 TISSUE SPECIMEN TO PATHOLOGY - SURGERY: ICD-10-PCS | Mod: 26,,, | Performed by: PATHOLOGY

## 2019-07-22 PROCEDURE — 49505 PR REPAIR ING HERNIA,5+Y/O,REDUCIBL: ICD-10-PCS | Mod: 50,,, | Performed by: SURGERY

## 2019-07-22 PROCEDURE — 36000707: Performed by: SURGERY

## 2019-07-22 PROCEDURE — 71000033 HC RECOVERY, INTIAL HOUR: Performed by: SURGERY

## 2019-07-22 PROCEDURE — 37000009 HC ANESTHESIA EA ADD 15 MINS: Performed by: SURGERY

## 2019-07-22 PROCEDURE — 88302 TISSUE EXAM BY PATHOLOGIST: CPT | Mod: 26,,, | Performed by: PATHOLOGY

## 2019-07-22 PROCEDURE — 36000706: Performed by: SURGERY

## 2019-07-22 DEVICE — MESH PROLENE HERNIA 3IN: Type: IMPLANTABLE DEVICE | Site: INGUINAL | Status: FUNCTIONAL

## 2019-07-22 RX ORDER — HYDROMORPHONE HYDROCHLORIDE 2 MG/ML
0.2 INJECTION, SOLUTION INTRAMUSCULAR; INTRAVENOUS; SUBCUTANEOUS EVERY 5 MIN PRN
Status: DISCONTINUED | OUTPATIENT
Start: 2019-07-22 | End: 2019-07-22 | Stop reason: HOSPADM

## 2019-07-22 RX ORDER — ONDANSETRON 2 MG/ML
INJECTION INTRAMUSCULAR; INTRAVENOUS
Status: DISCONTINUED | OUTPATIENT
Start: 2019-07-22 | End: 2019-07-22

## 2019-07-22 RX ORDER — SODIUM CHLORIDE 9 MG/ML
INJECTION, SOLUTION INTRAVENOUS CONTINUOUS
Status: DISCONTINUED | OUTPATIENT
Start: 2019-07-22 | End: 2019-07-22 | Stop reason: HOSPADM

## 2019-07-22 RX ORDER — CEFAZOLIN SODIUM 2 G/50ML
2 SOLUTION INTRAVENOUS
Status: COMPLETED | OUTPATIENT
Start: 2019-07-22 | End: 2019-07-22

## 2019-07-22 RX ORDER — HYDROCODONE BITARTRATE AND ACETAMINOPHEN 5; 325 MG/1; MG/1
1 TABLET ORAL EVERY 6 HOURS PRN
Qty: 30 TABLET | Refills: 0 | Status: SHIPPED | OUTPATIENT
Start: 2019-07-22 | End: 2021-08-03

## 2019-07-22 RX ORDER — FENTANYL CITRATE 50 UG/ML
INJECTION, SOLUTION INTRAMUSCULAR; INTRAVENOUS
Status: DISCONTINUED | OUTPATIENT
Start: 2019-07-22 | End: 2019-07-22

## 2019-07-22 RX ORDER — GLYCOPYRROLATE 0.2 MG/ML
INJECTION INTRAMUSCULAR; INTRAVENOUS
Status: DISCONTINUED | OUTPATIENT
Start: 2019-07-22 | End: 2019-07-22

## 2019-07-22 RX ORDER — MORPHINE SULFATE 10 MG/ML
3 INJECTION INTRAMUSCULAR; INTRAVENOUS; SUBCUTANEOUS
Status: DISCONTINUED | OUTPATIENT
Start: 2019-07-22 | End: 2019-07-22 | Stop reason: HOSPADM

## 2019-07-22 RX ORDER — PROPOFOL 10 MG/ML
VIAL (ML) INTRAVENOUS
Status: DISCONTINUED | OUTPATIENT
Start: 2019-07-22 | End: 2019-07-22

## 2019-07-22 RX ORDER — SODIUM CHLORIDE 0.9 % (FLUSH) 0.9 %
3 SYRINGE (ML) INJECTION
Status: DISCONTINUED | OUTPATIENT
Start: 2019-07-22 | End: 2019-07-22 | Stop reason: HOSPADM

## 2019-07-22 RX ORDER — SODIUM CHLORIDE, SODIUM LACTATE, POTASSIUM CHLORIDE, CALCIUM CHLORIDE 600; 310; 30; 20 MG/100ML; MG/100ML; MG/100ML; MG/100ML
INJECTION, SOLUTION INTRAVENOUS CONTINUOUS PRN
Status: DISCONTINUED | OUTPATIENT
Start: 2019-07-22 | End: 2019-07-22

## 2019-07-22 RX ORDER — FENTANYL CITRATE 50 UG/ML
25 INJECTION, SOLUTION INTRAMUSCULAR; INTRAVENOUS EVERY 5 MIN PRN
Status: DISCONTINUED | OUTPATIENT
Start: 2019-07-22 | End: 2019-07-22 | Stop reason: HOSPADM

## 2019-07-22 RX ORDER — HYDROCODONE BITARTRATE AND ACETAMINOPHEN 5; 325 MG/1; MG/1
1 TABLET ORAL ONCE
Status: COMPLETED | OUTPATIENT
Start: 2019-07-22 | End: 2019-07-22

## 2019-07-22 RX ORDER — BUPIVACAINE HYDROCHLORIDE 2.5 MG/ML
INJECTION, SOLUTION INFILTRATION; PERINEURAL
Status: DISCONTINUED | OUTPATIENT
Start: 2019-07-22 | End: 2019-07-22 | Stop reason: HOSPADM

## 2019-07-22 RX ORDER — PHENYLEPHRINE HYDROCHLORIDE 10 MG/ML
INJECTION INTRAVENOUS
Status: DISCONTINUED | OUTPATIENT
Start: 2019-07-22 | End: 2019-07-22

## 2019-07-22 RX ORDER — LIDOCAINE HCL/PF 100 MG/5ML
SYRINGE (ML) INTRAVENOUS
Status: DISCONTINUED | OUTPATIENT
Start: 2019-07-22 | End: 2019-07-22

## 2019-07-22 RX ORDER — METOCLOPRAMIDE HYDROCHLORIDE 5 MG/ML
INJECTION INTRAMUSCULAR; INTRAVENOUS
Status: DISCONTINUED | OUTPATIENT
Start: 2019-07-22 | End: 2019-07-22

## 2019-07-22 RX ORDER — MIDAZOLAM HYDROCHLORIDE 1 MG/ML
INJECTION, SOLUTION INTRAMUSCULAR; INTRAVENOUS
Status: DISCONTINUED | OUTPATIENT
Start: 2019-07-22 | End: 2019-07-22

## 2019-07-22 RX ORDER — ACETAMINOPHEN 10 MG/ML
1000 INJECTION, SOLUTION INTRAVENOUS ONCE
Status: COMPLETED | OUTPATIENT
Start: 2019-07-22 | End: 2019-07-22

## 2019-07-22 RX ADMIN — HYDROCODONE BITARTRATE AND ACETAMINOPHEN 1 TABLET: 5; 325 TABLET ORAL at 10:07

## 2019-07-22 RX ADMIN — FENTANYL CITRATE 25 MCG: 50 INJECTION INTRAMUSCULAR; INTRAVENOUS at 08:07

## 2019-07-22 RX ADMIN — PROPOFOL 150 MG: 10 INJECTION, EMULSION INTRAVENOUS at 07:07

## 2019-07-22 RX ADMIN — CEFAZOLIN SODIUM 2 G: 2 SOLUTION INTRAVENOUS at 07:07

## 2019-07-22 RX ADMIN — LIDOCAINE HYDROCHLORIDE 100 MG: 20 INJECTION, SOLUTION INTRAVENOUS at 07:07

## 2019-07-22 RX ADMIN — SODIUM CHLORIDE: 0.9 INJECTION, SOLUTION INTRAVENOUS at 07:07

## 2019-07-22 RX ADMIN — FENTANYL CITRATE 50 MCG: 50 INJECTION INTRAMUSCULAR; INTRAVENOUS at 09:07

## 2019-07-22 RX ADMIN — FENTANYL CITRATE 50 MCG: 50 INJECTION INTRAMUSCULAR; INTRAVENOUS at 07:07

## 2019-07-22 RX ADMIN — METOCLOPRAMIDE 10 MG: 5 INJECTION, SOLUTION INTRAMUSCULAR; INTRAVENOUS at 07:07

## 2019-07-22 RX ADMIN — ACETAMINOPHEN 1000 MG: 10 INJECTION, SOLUTION INTRAVENOUS at 09:07

## 2019-07-22 RX ADMIN — MIDAZOLAM HYDROCHLORIDE 2 MG: 1 INJECTION, SOLUTION INTRAMUSCULAR; INTRAVENOUS at 07:07

## 2019-07-22 RX ADMIN — SODIUM CHLORIDE, SODIUM LACTATE, POTASSIUM CHLORIDE, AND CALCIUM CHLORIDE: .6; .31; .03; .02 INJECTION, SOLUTION INTRAVENOUS at 08:07

## 2019-07-22 RX ADMIN — PHENYLEPHRINE HYDROCHLORIDE 100 MCG: 10 INJECTION INTRAVENOUS at 08:07

## 2019-07-22 RX ADMIN — SODIUM CHLORIDE: 0.9 INJECTION, SOLUTION INTRAVENOUS at 10:07

## 2019-07-22 RX ADMIN — GLYCOPYRROLATE 0.2 MG: 0.2 INJECTION, SOLUTION INTRAMUSCULAR; INTRAVENOUS at 07:07

## 2019-07-22 RX ADMIN — ONDANSETRON 4 MG: 2 INJECTION, SOLUTION INTRAMUSCULAR; INTRAVENOUS at 07:07

## 2019-07-22 RX ADMIN — HYDROCODONE BITARTRATE AND ACETAMINOPHEN 1 TABLET: 5; 325 TABLET ORAL at 11:07

## 2019-07-22 NOTE — ANESTHESIA PREPROCEDURE EVALUATION
07/22/2019  Lucius Kohler is a 38 y.o., male.    Anesthesia Evaluation     I have reviewed the Nursing Notes.      Review of Systems  Anesthesia Hx:  No problems with previous Anesthesia   Social:  Former Smoker    Cardiovascular:  Cardiovascular Normal Exercise tolerance: good     Pulmonary:  Pulmonary Normal    Renal/:   renal calculi    Hepatic/GI:  Hepatic/GI Normal    Neurological:  Neurology Normal    Endocrine:  Endocrine Normal        Physical Exam  General:  Well nourished    Airway/Jaw/Neck:  AIRWAY FINDINGS: Normal      Chest/Lungs:  Chest/Lungs Clear    Heart/Vascular:  Heart Findings: Normal       Mental Status:  Mental Status Findings: Normal        Anesthesia Plan  Type of Anesthesia, risks & benefits discussed:  Anesthesia Type:  general  Patient's Preference:   Intra-op Monitoring Plan: standard ASA monitors  Intra-op Monitoring Plan Comments:   Post Op Pain Control Plan:   Post Op Pain Control Plan Comments:   Induction:   IV  Beta Blocker:  Patient is not currently on a Beta-Blocker (No further documentation required).       Informed Consent: Patient understands risks and agrees with Anesthesia plan.  Questions answered. Anesthesia consent signed with patient.  ASA Score: 2     Day of Surgery Review of History & Physical:  There are no significant changes.  H&P update referred to the provider.         Ready For Surgery From Anesthesia Perspective.

## 2019-07-22 NOTE — TRANSFER OF CARE
"Anesthesia Transfer of Care Note    Patient: Lucius Kohler    Procedure(s) Performed: Procedure(s) (LRB):  REPAIR, HERNIA, INGUINAL, BILATERAL (Bilateral)    Patient location: PACU    Anesthesia Type: general    Transport from OR: Transported from OR on room air with adequate spontaneous ventilation    Post pain: adequate analgesia    Post assessment: no apparent anesthetic complications and tolerated procedure well    Post vital signs: stable    Level of consciousness: sedated and responds to stimulation    Nausea/Vomiting: no nausea/vomiting    Complications: none    Transfer of care protocol was followed      Last vitals:   Visit Vitals  /78 (BP Location: Right arm)   Pulse 96   Temp 36.5 °C (97.7 °F) (Oral)   Resp 15   Ht 5' 8" (1.727 m)   Wt 88.3 kg (194 lb 10.7 oz)   SpO2 99%   BMI 29.60 kg/m²     "

## 2019-07-22 NOTE — DISCHARGE INSTRUCTIONS
[facility]   Office # [phone]     Discharge Instructions for Same Day Surgery     Call the office for and appointment if one has not already been made.     Diet: Drink plenty of fluids the first 48 hours and you may resume your   usual diet.     Activity: No heavy lifting (over 10 pounds), pushing or pulling until your   post op visit. Your doctor's office may have told you to limit your lifting to less weight, or even no weight.  Be sure to follow those instructions.    Note: You may ride in a car and you may drive when comfortable.     Incision:  Dermabond / Prineotape    or a material like it was used on your incision.   It is like a liquid glue.   Do not peel or try to remove it it will start to fall off in 7-10 days on its' own.   It is OK to shower, pat dry, do not apply any creams or lotions.    No tub baths, swimming pools, hot tubs or submersion of the incision until your surgeon says it's ok.     Do not drive, drink alcohol, or sign legal documents for 24 hours, or if taking narcotic pain medication.                                             -Exparel information-      To help control your pain after surgery, your surgeon injected Exparel (bupicacaine liposome injectable suspension) into your surgical incision just before the end of the procedure.      Exparel is a local analgesic that contains the local anesthetic bupivacaine..  Local anesthetics provide pain relief by numbing the tissue around the surgical site.    Exparel is specifically designed to release pain medication over time an can control pain for up to 72 hours.    In addition to Exparel, your surgeon may provide other pain medications to control your pain.    Each patient is different and responds differently to pain medication.  Depending on how you respond to Exparel, you may require less additional pain medication during your recovery.    Side effects can occur with any medication and it is important not to ignore  anything you may be experiencing.  Some patients who received Exparel experienced nausea, vomiting, or constipation.  Rarely, patients who receive bupivacaine (the active ingredient in Exparel) have experienced numbness and tingling in their mouth or lips, lightheadedness, or anxiety.  Speak with your doctor right away if you think you may be experiencing any of these sensations, or if you have other questions regarding possible side effects.    Products that contain bupivacaine, like Exparel, may cause a temporary loss of sensation or the ability to move in the area where bupivacaine was injected.    Other formulations of bupivacaine should not be administered within 96 hours following administrations of Exparel.      Do not remove the teal colored bracelet that you have on for 96 hours.  (4 DAYS)    This bracelet will let other health care workers know that you have received Exparel, and not to give you bupivacaine during this 96 hours.       Medical: Call the doctor for any of the following problems: fever above 101,   severe pain, bleeding, or abdominal distention (swelling).   If constipated you may take any stool softener you choose.     Occasionally small areas of skin numbness or an unpleasant skin sensation can result. Also, you may find that your incision is swollen and tender for a few days.  Some redness around sutures and staples is a normal reaction, but if the discomfort persists or worsens, call you doctor.     Fall Prevention  Millions of people fall every year and injure themselves. You may have had anesthesia or sedation which may increase your risk of falling. You may have health issues that put you at an increased risk of falling.     Here are ways to reduce your risk of falling.  ·   · Make your home safe by keeping walkways clear of objects you may trip over.  · Use non-slip pads under rugs. Do not use area rugs or small throw rugs.  · Use non-slip mats in bathtubs and showers.  · Install  handrails and lights on staircases.  · Do not walk in poorly lit areas.  · Do not stand on chairs or wobbly ladders.  · Use caution when reaching overhead or looking upward. This position can cause a loss of balance.  · Be sure your shoes fit properly, have non-slip bottoms and are in good condition.   · Wear shoes both inside and out. Avoid going barefoot or wearing slippers.  · Be cautious when going up and down stairs, curbs, and when walking on uneven sidewalks.  · If your balance is poor, consider using a cane or walker.  · If your fall was related to alcohol use, stop or limit alcohol intake.   · If your fall was related to use of sleeping medicines, talk to your doctor about this. You may need to reduce your dosage at bedtime if you awaken during the night to go to the bathroom.    · To reduce the need for nighttime bathroom trips:  ¨ Avoid drinking fluids for several hours before going to bed  ¨ Empty your bladder before going to bed  ¨ Men can keep a urinal at the bedside  · Stay as active as you can. Balance, flexibility, strength, and endurance all come from exercise. They all play a role in preventing falls. Ask your healthcare provider which types of activity are right for you.  · Get your vision checked on a regular basis.  · If you have pets, know where they are before you stand up or walk so you don't trip over them.  · Use night lights.    Last dose of hydrocodone at 12noon.

## 2019-07-22 NOTE — H&P
"History & Physical     SUBJECTIVE:      History of Present Illness:  Patient is a 38 y.o. male presents with left inguinal hernia without obstruction.         Chief Complaint   Patient presents with    Consult               Review of patient's allergies indicates:   Allergen Reactions    Penicillins         Burning with urination after taken penicillin         No current outpatient medications on file.      No current facility-administered medications for this visit.               Past Medical History:   Diagnosis Date    Kidney stones      Skin cancer              Past Surgical History:   Procedure Laterality Date    skin cancer surgery        WISDOM TOOTH EXTRACTION          History reviewed. No pertinent family history.  Social History            Tobacco Use    Smoking status: Former Smoker       Types: Cigars   Substance Use Topics    Alcohol use: Yes    Drug use: Not on file         Review of Systems:  Review of Systems   Constitutional: Negative.    HENT: Negative.    Eyes: Negative.    Respiratory: Negative.    Cardiovascular: Negative.    Gastrointestinal: Negative.    Endocrine: Negative.    Musculoskeletal: Negative.    Skin: Negative.    Allergic/Immunologic: Negative.    Neurological: Negative.    Hematological: Negative.    Psychiatric/Behavioral: Negative.    All other systems reviewed and are negative.        OBJECTIVE:      Vital Signs (Most Recent)  Pulse: 80 (06/19/19 1438)  BP: 110/77 (06/19/19 1438)  5' 8" (1.727 m)  85.7 kg (188 lb 15 oz)      Physical Exam:  Physical Exam   Constitutional: He is oriented to person, place, and time. He appears well-developed and well-nourished.   HENT:   Head: Normocephalic and atraumatic.   Right Ear: External ear normal.   Left Ear: External ear normal.   Nose: Nose normal.   Mouth/Throat: Oropharynx is clear and moist.   Eyes: Pupils are equal, round, and reactive to light. Conjunctivae and EOM are normal.   Neck: Normal range of motion. Neck supple. "   Cardiovascular: Normal rate, regular rhythm, normal heart sounds and intact distal pulses.   Pulmonary/Chest: Effort normal and breath sounds normal.   Abdominal: Soft. Bowel sounds are normal. A hernia is present. Hernia confirmed positive in the left inguinal area.   Genitourinary:         Musculoskeletal: Normal range of motion.   Neurological: He is alert and oriented to person, place, and time. He has normal reflexes.   Skin: Skin is warm and dry.   Psychiatric: He has a normal mood and affect. His behavior is normal. Thought content normal.   Vitals reviewed.        Laboratory  none     Diagnostic Results:  none     ASSESSMENT/PLAN:      Bilateral inguinal hernia      PLAN:Plan      I will take him to the OR for bilateral inguinal hernia repair with risks and possible complications explained

## 2019-07-22 NOTE — INTERVAL H&P NOTE
The patient has been examined and the H&P has been reviewed:    I concur with the findings and no changes have occurred since H&P was written.    Anesthesia/Surgery risks, benefits and alternative options discussed and understood by patient/family.          Active Hospital Problems    Diagnosis  POA    Non-recurrent bilateral inguinal hernia without obstruction or gangrene [K40.20]  Yes      Resolved Hospital Problems   No resolved problems to display.

## 2019-07-22 NOTE — BRIEF OP NOTE
[facility]  Brief Operative Note     SUMMARY     Surgery Date: 7/22/2019     Surgeon(s) and Role:     * Ortega Mckinnon MD - Primary    Assisting Surgeon: Na Clifton MD PGY-5    Pre-op Diagnosis:  Non-recurrent bilateral inguinal hernia without obstruction or gangrene [K40.20]    Post-op Diagnosis:  Post-Op Diagnosis Codes:     * Non-recurrent bilateral inguinal hernia without obstruction or gangrene [K40.20]    Procedure(s) (LRB):  REPAIR, HERNIA, INGUINAL, BILATERAL (Bilateral)    Anesthesia: General    Description of the findings of the procedure: bilateral inguinal hernias    Findings/Key Components: same    Estimated Blood Loss: minimal         Specimens:   Specimen (12h ago, onward)    Start     Ordered    07/22/19 0817  Specimen to Pathology - Surgery  Once     Comments:  Right Inguinal Hernia SacLeft Inguinal Hernia Sac     Start Status     07/22/19 0817 Collected (07/22/19 0815) Order ID: [id number]       07/22/19 0833          Discharge Note    SUMMARY     Admit Date: 7/22/2019    Discharge Date and Time:  07/22/2019 9:03 AM    Hospital Course (synopsis of major diagnoses, care, treatment, and services provided during the course of the hospital stay): uneventful post op course     Final Diagnosis: Post-Op Diagnosis Codes:     * Non-recurrent bilateral inguinal hernia without obstruction or gangrene [K40.20]    Disposition: Home or Self Care    Follow Up/Patient Instructions:     Medications:  Reconciled Home Medications:      Medication List      START taking these medications    HYDROcodone-acetaminophen 5-325 mg per tablet  Commonly known as:  NORCO  Take 1 tablet by mouth every 6 (six) hours as needed for Pain.        CONTINUE taking these medications    pediatric multivitamin chewable tablet  Take 1 tablet by mouth once daily.          Discharge Procedure Orders   Diet general     Call MD for:  temperature >100.4     Call MD for:  persistent nausea and vomiting     Call MD for:   severe uncontrolled pain     Call MD for:  difficulty breathing, headache or visual disturbances     Call MD for:  redness, tenderness, or signs of infection (pain, swelling, redness, odor or green/yellow discharge around incision site)     Call MD for:  hives     Remove dressing in 24 hours     Shower on day dressing removed (No bath)     Follow-up Information     Ortega Mckinnon MD.    Specialties:  General Surgery, Oncology  Contact information:  [address]  [address] [place] [id number] [phone]

## 2019-07-22 NOTE — OP NOTE
Inguinal hernia      DATE OF PROCEDURE: 07/22/2019     PREOPERATIVE DIAGNOSIS:  Bilateral inguinal hernia.     POSTOPERATIVE DIAGNOSIS: same     PROCEDURE PERFORMED:  Bilateral inguinal hernia repair.     SURGEON: Ortega Mckinnon M.D.    ASST: Na Clifton MD PGY-5     ANESTHESIA: General.     BLOOD LOSS: Minimal.     DESCRIPTION OF OPERATION: The patient was brought to the Operating Room, placed  on the operating room table in supine position. Under adequate general   anesthesia, prepped and draped around his right groin in the usual sterile   fashion. An ilioinguinal block was done in the patient's anterior superior   iliac spine transdermally with Exparel. The patient then had an incision made   in his groin, deepened to expose the external oblique aponeurosis. This was   divided. Spermatic cord was isolated. A small sac was identified and ligated.   The preperitoneal space was then entered and a Prolene hernia system mesh was   placed. The anterior portion of it was then placed in the inguinal canal and   tacked down in several spaces. A small slit was made to transmit the spermatic   cord. The external oblique aponeurosis was then reapproximated along with the   subcutaneous tissue and skin all in layers with absorbable suture. Prineo tape.    The same was done to the contralateral side patient was awakened and transported to recovery room in satisfactory condition.  was used as well as a bandage. The patient was awakened and transported to the   Recovery Room in satisfactory condition.

## 2019-07-23 ENCOUNTER — HOSPITAL ENCOUNTER (EMERGENCY)
Facility: HOSPITAL | Age: 39
Discharge: HOME | End: 2019-07-23
Attending: EMERGENCY MEDICINE
Payer: COMMERCIAL

## 2019-07-23 ENCOUNTER — NURSE TRIAGE (OUTPATIENT)
Dept: ADMINISTRATIVE | Facility: CLINIC | Age: 39
End: 2019-07-23

## 2019-07-23 VITALS
WEIGHT: 192 LBS | SYSTOLIC BLOOD PRESSURE: 116 MMHG | TEMPERATURE: 99 F | DIASTOLIC BLOOD PRESSURE: 66 MMHG | RESPIRATION RATE: 18 BRPM | HEART RATE: 103 BPM | BODY MASS INDEX: 29.1 KG/M2 | HEIGHT: 68 IN | OXYGEN SATURATION: 96 %

## 2019-07-23 DIAGNOSIS — R00.0 TACHYCARDIA: ICD-10-CM

## 2019-07-23 DIAGNOSIS — T14.8XXA WOUND INFECTION: ICD-10-CM

## 2019-07-23 DIAGNOSIS — L08.9 WOUND INFECTION: ICD-10-CM

## 2019-07-23 DIAGNOSIS — R50.9 FEVER, UNSPECIFIED FEVER CAUSE: Primary | ICD-10-CM

## 2019-07-23 LAB
ALBUMIN SERPL BCP-MCNC: 4.5 G/DL (ref 3.5–5.2)
ALP SERPL-CCNC: 91 U/L (ref 55–135)
ALT SERPL W/O P-5'-P-CCNC: 27 U/L (ref 10–44)
ANION GAP SERPL CALC-SCNC: 10 MMOL/L (ref 8–16)
AST SERPL-CCNC: 25 U/L (ref 10–40)
BASOPHILS # BLD AUTO: 0.01 K/UL (ref 0–0.2)
BASOPHILS NFR BLD: 0.1 % (ref 0–1.9)
BILIRUB SERPL-MCNC: 0.8 MG/DL (ref 0.1–1)
BILIRUB UR QL STRIP: NEGATIVE
BUN SERPL-MCNC: 15 MG/DL (ref 6–20)
CALCIUM SERPL-MCNC: 9.9 MG/DL (ref 8.7–10.5)
CHLORIDE SERPL-SCNC: 104 MMOL/L (ref 95–110)
CLARITY UR: CLEAR
CO2 SERPL-SCNC: 25 MMOL/L (ref 23–29)
COLOR UR: YELLOW
CREAT SERPL-MCNC: 1.3 MG/DL (ref 0.5–1.4)
DIFFERENTIAL METHOD: ABNORMAL
EOSINOPHIL # BLD AUTO: 0.1 K/UL (ref 0–0.5)
EOSINOPHIL NFR BLD: 0.5 % (ref 0–8)
ERYTHROCYTE [DISTWIDTH] IN BLOOD BY AUTOMATED COUNT: 12.5 % (ref 11.5–14.5)
EST. GFR  (AFRICAN AMERICAN): >60 ML/MIN/1.73 M^2
EST. GFR  (NON AFRICAN AMERICAN): >60 ML/MIN/1.73 M^2
GLUCOSE SERPL-MCNC: 106 MG/DL (ref 70–110)
GLUCOSE UR QL STRIP: NEGATIVE
HCT VFR BLD AUTO: 41.2 % (ref 40–54)
HGB BLD-MCNC: 13.7 G/DL (ref 14–18)
HGB UR QL STRIP: NEGATIVE
KETONES UR QL STRIP: ABNORMAL
LACTATE SERPL-SCNC: 1.1 MMOL/L (ref 0.5–2.2)
LEUKOCYTE ESTERASE UR QL STRIP: NEGATIVE
LYMPHOCYTES # BLD AUTO: 1.5 K/UL (ref 1–4.8)
LYMPHOCYTES NFR BLD: 14.3 % (ref 18–48)
MCH RBC QN AUTO: 30.2 PG (ref 27–31)
MCHC RBC AUTO-ENTMCNC: 33.3 G/DL (ref 32–36)
MCV RBC AUTO: 91 FL (ref 82–98)
MONOCYTES # BLD AUTO: 1.1 K/UL (ref 0.3–1)
MONOCYTES NFR BLD: 10.2 % (ref 4–15)
NEUTROPHILS # BLD AUTO: 7.9 K/UL (ref 1.8–7.7)
NEUTROPHILS NFR BLD: 75.1 % (ref 38–73)
NITRITE UR QL STRIP: NEGATIVE
PH UR STRIP: 6 [PH] (ref 5–8)
PLATELET # BLD AUTO: 198 K/UL (ref 150–350)
PMV BLD AUTO: 10.8 FL (ref 9.2–12.9)
POTASSIUM SERPL-SCNC: 4.3 MMOL/L (ref 3.5–5.1)
PROT SERPL-MCNC: 8.7 G/DL (ref 6–8.4)
PROT UR QL STRIP: NEGATIVE
RBC # BLD AUTO: 4.53 M/UL (ref 4.6–6.2)
SODIUM SERPL-SCNC: 139 MMOL/L (ref 136–145)
SP GR UR STRIP: 1.02 (ref 1–1.03)
URN SPEC COLLECT METH UR: ABNORMAL
UROBILINOGEN UR STRIP-ACNC: NEGATIVE EU/DL
WBC # BLD AUTO: 10.54 K/UL (ref 3.9–12.7)

## 2019-07-23 PROCEDURE — 93005 ELECTROCARDIOGRAM TRACING: CPT

## 2019-07-23 PROCEDURE — 83605 ASSAY OF LACTIC ACID: CPT

## 2019-07-23 PROCEDURE — 93010 EKG 12-LEAD: ICD-10-PCS | Mod: ,,, | Performed by: INTERNAL MEDICINE

## 2019-07-23 PROCEDURE — 25000003 PHARM REV CODE 250: Performed by: EMERGENCY MEDICINE

## 2019-07-23 PROCEDURE — 85025 COMPLETE CBC W/AUTO DIFF WBC: CPT

## 2019-07-23 PROCEDURE — 96365 THER/PROPH/DIAG IV INF INIT: CPT

## 2019-07-23 PROCEDURE — 96375 TX/PRO/DX INJ NEW DRUG ADDON: CPT

## 2019-07-23 PROCEDURE — 25500020 PHARM REV CODE 255: Performed by: EMERGENCY MEDICINE

## 2019-07-23 PROCEDURE — S0030 INJECTION, METRONIDAZOLE: HCPCS | Performed by: EMERGENCY MEDICINE

## 2019-07-23 PROCEDURE — 87040 BLOOD CULTURE FOR BACTERIA: CPT | Mod: 59

## 2019-07-23 PROCEDURE — 99285 EMERGENCY DEPT VISIT HI MDM: CPT | Mod: 25

## 2019-07-23 PROCEDURE — 63600175 PHARM REV CODE 636 W HCPCS: Performed by: EMERGENCY MEDICINE

## 2019-07-23 PROCEDURE — 93010 ELECTROCARDIOGRAM REPORT: CPT | Mod: ,,, | Performed by: INTERNAL MEDICINE

## 2019-07-23 PROCEDURE — 96368 THER/DIAG CONCURRENT INF: CPT

## 2019-07-23 PROCEDURE — 80053 COMPREHEN METABOLIC PANEL: CPT

## 2019-07-23 PROCEDURE — 81003 URINALYSIS AUTO W/O SCOPE: CPT

## 2019-07-23 RX ORDER — METRONIDAZOLE 500 MG/100ML
500 INJECTION, SOLUTION INTRAVENOUS 3 TIMES DAILY
Status: DISCONTINUED | OUTPATIENT
Start: 2019-07-23 | End: 2019-07-24 | Stop reason: HOSPADM

## 2019-07-23 RX ORDER — CLINDAMYCIN HYDROCHLORIDE 150 MG/1
300 CAPSULE ORAL
Status: COMPLETED | OUTPATIENT
Start: 2019-07-23 | End: 2019-07-23

## 2019-07-23 RX ORDER — FENTANYL CITRATE 50 UG/ML
50 INJECTION, SOLUTION INTRAMUSCULAR; INTRAVENOUS
Status: COMPLETED | OUTPATIENT
Start: 2019-07-23 | End: 2019-07-23

## 2019-07-23 RX ORDER — CIPROFLOXACIN 2 MG/ML
400 INJECTION, SOLUTION INTRAVENOUS
Status: DISCONTINUED | OUTPATIENT
Start: 2019-07-23 | End: 2019-07-24 | Stop reason: HOSPADM

## 2019-07-23 RX ORDER — CLINDAMYCIN HYDROCHLORIDE 150 MG/1
300 CAPSULE ORAL EVERY 8 HOURS
Qty: 60 CAPSULE | Refills: 0 | Status: SHIPPED | OUTPATIENT
Start: 2019-07-23 | End: 2019-08-02

## 2019-07-23 RX ORDER — ACETAMINOPHEN 650 MG/1
650 SUPPOSITORY RECTAL
Status: COMPLETED | OUTPATIENT
Start: 2019-07-23 | End: 2019-07-23

## 2019-07-23 RX ADMIN — ACETAMINOPHEN 650 MG: 650 SUPPOSITORY RECTAL at 09:07

## 2019-07-23 RX ADMIN — IOHEXOL 75 ML: 350 INJECTION, SOLUTION INTRAVENOUS at 09:07

## 2019-07-23 RX ADMIN — SODIUM CHLORIDE 2613 ML: 0.9 INJECTION, SOLUTION INTRAVENOUS at 08:07

## 2019-07-23 RX ADMIN — CIPROFLOXACIN 400 MG: 2 INJECTION, SOLUTION INTRAVENOUS at 08:07

## 2019-07-23 RX ADMIN — FENTANYL CITRATE 50 MCG: 50 INJECTION INTRAMUSCULAR; INTRAVENOUS at 08:07

## 2019-07-23 RX ADMIN — METRONIDAZOLE 500 MG: 500 INJECTION, SOLUTION INTRAVENOUS at 08:07

## 2019-07-23 RX ADMIN — VANCOMYCIN HYDROCHLORIDE 2000 MG: 100 INJECTION, POWDER, LYOPHILIZED, FOR SOLUTION INTRAVENOUS at 08:07

## 2019-07-23 RX ADMIN — CLINDAMYCIN HYDROCHLORIDE 300 MG: 150 CAPSULE ORAL at 10:07

## 2019-07-24 ENCOUNTER — PES CALL (OUTPATIENT)
Dept: ADMINISTRATIVE | Facility: CLINIC | Age: 39
End: 2019-07-24

## 2019-07-24 NOTE — TELEPHONE ENCOUNTER
Reason for Disposition   Fever > 100.5 F (38.1 C)    Protocols used: ST POST-OP SYMPTOMS AND [id number]-MT    Temp 101.1 earlier  5 mins ago temp 102.7  Had hernia repair surgery yesterday  Recommended patient to report to ED within next 4 hours.  He agrees to plan.

## 2019-07-24 NOTE — ED TRIAGE NOTES
Pt states he had bilat hernia repair yesterday.  Today c/o fever and swelling near incision site.  Pt c/o pain at incision sites and hips.  3/10 at rest; 7/10 when moving.  Alert and oriented; able to make needs known.

## 2019-07-24 NOTE — ANESTHESIA POSTPROCEDURE EVALUATION
Anesthesia Post Evaluation    Patient: Lucius Kohler    Procedure(s) Performed: Procedure(s) (LRB):  REPAIR, HERNIA, INGUINAL, BILATERAL (Bilateral)    Final Anesthesia Type: general  Patient location during evaluation: PACU  Patient participation: Yes- Able to Participate  Level of consciousness: awake and alert  Post-procedure vital signs: reviewed and stable  Pain management: adequate  Airway patency: patent  PONV status at discharge: No PONV  Anesthetic complications: no      Cardiovascular status: blood pressure returned to baseline and hemodynamically stable  Respiratory status: unassisted and spontaneous ventilation  Hydration status: euvolemic  Follow-up not needed.          Vitals Value Taken Time   /66 7/23/2019  7:51 PM   Temp 37.1 °C (98.7 °F) 7/23/2019 10:20 PM   Pulse 103 7/23/2019  7:51 PM   Resp 18 7/23/2019  7:51 PM   SpO2 96 % 7/23/2019  7:51 PM         Event Time     Out of Recovery 09:37:24          Pain/Shelly Score: Pain Rating Prior to Med Admin: 6 (7/23/2019  9:49 PM)

## 2019-07-24 NOTE — ED PROVIDER NOTES
Encounter Date: 2019  SORT:   37 y/o male presenting for evaluation of fever and worsening abdominal pain to bilateral hips and lower abdomen and swelling to surgical site s/p hernia repair yesterday by Dr. Mckinnon. Initial orders placed. MAY Bryan PA-C      History     Chief Complaint   Patient presents with    Post-op Problem     Pt reports he has hernia repair yesterday and began to run a fever today. pt also c/o swelling away from the surgical site.      38 y.o. male Past Medical History:  No date: Kidney stones  No date: Skin cancer     Notes b/l hernia repair here with Dr. Mckinnon yesterday, notes that today started to have abd pain, endorses subj f/c, tmax at home was 102.7        Review of patient's allergies indicates:   Allergen Reactions    Penicillins      Burning with urination after taken penicillin  Able to take keflex without any problems     Past Medical History:   Diagnosis Date    Kidney stones     Skin cancer      Past Surgical History:   Procedure Laterality Date    moles removed      REPAIR, HERNIA, INGUINAL, BILATERAL Bilateral 2019    Performed by [facility]tega [facility]on [facility]    skin cancer surgery      WISDOM TOOTH EXTRACTION       No family history on file.  Social History     Tobacco Use    Smoking status: Former Smoker     Types: Cigars     Last attempt to quit: 2012     Years since quittin.5    Smokeless tobacco: Never Used   Substance Use Topics    Alcohol use: Yes     Comment: occassional    Drug use: Never     Review of Systems   Constitutional: Negative for fever.   HENT: Negative for sore throat.    Respiratory: Negative for shortness of breath.    Cardiovascular: Negative for chest pain.   Gastrointestinal: Positive for abdominal pain. Negative for nausea.   Genitourinary: Negative for dysuria.   Musculoskeletal: Negative for back pain.   Skin: Negative for rash.   Neurological: Negative for weakness.   Hematological: Does not bruise/bleed easily.    All other systems reviewed and are negative.      Physical Exam     Initial Vitals [07/23/19 1951]   BP Pulse Resp Temp SpO2   116/66 103 18 (!) 100.9 °F (38.3 °C) 96 %      MAP       --         Physical Exam    Nursing note and vitals reviewed.  Constitutional: He appears well-developed and well-nourished.   HENT:   Head: Normocephalic and atraumatic.   Eyes: EOM are normal. Pupils are equal, round, and reactive to light.   Cardiovascular: Normal rate, regular rhythm and normal heart sounds.   Pulmonary/Chest: Effort normal.   Abdominal: Soft. He exhibits no distension. There is tenderness. There is no rebound and no guarding.   Musculoskeletal: He exhibits no edema or tenderness.   Neurological: He is alert and oriented to person, place, and time. No cranial nerve deficit.   Skin: Skin is warm and dry.   Psychiatric: He has a normal mood and affect.     LLQ ttp  B/l inguinal hernia surgical sites c/d/i healing well    ED Course   Procedures  Labs Reviewed - No data to display  EKG Readings: (Independently Interpreted)   Hr 95, nl axis/intervals, no bettye/twi, non acute, no stemi       Imaging Results    None          Medical Decision Making:   Initial Assessment:   Pt with +sirs criteria. Allergic to pcn, likely intra abd source. Have started cipro/flagyl and vanc given recent operation. Will get sepsis workup, initiate early goal directed treatment and obtain ct/consult surgery.  Clinical Tests:   Sepsis Perfusion Assessment: I attest, a sepsis perfusion exam was performed within 6 hours of Septic Shock presentation, following fluid resuscitation.                 Dr. Clifton from surgery has evaluated the patient.      Labs Reviewed   CBC W/ AUTO DIFFERENTIAL - Abnormal; Notable for the following components:       Result Value    RBC 4.53 (*)     Hemoglobin 13.7 (*)     Gran # (ANC) 7.9 (*)     Mono # 1.1 (*)     Gran% 75.1 (*)     Lymph% 14.3 (*)     All other components within normal limits   COMPREHENSIVE  METABOLIC PANEL - Abnormal; Notable for the following components:    Total Protein 8.7 (*)     All other components within normal limits   URINALYSIS, REFLEX TO URINE CULTURE - Abnormal; Notable for the following components:    Ketones, UA Trace (*)     All other components within normal limits    Narrative:     Preferred Collection Type->Urine, Clean Catch   CULTURE, BLOOD   CULTURE, BLOOD   LACTIC ACID, PLASMA   LACTIC ACID, PLASMA   TYPE & SCREEN       CT Abdomen Pelvis With Contrast   Final Result      1. Interval surgical change of hernia repair, without findings to suggest focal oral nice fluid collection or large hematoma.   2. Urinary bladder wall thickening, nonspecific, correlation with urinalysis recommended noting there is mild right urothelial thickening.   3. Findings suggesting developing constipation.   4. Possible hepatic steatosis, correlation with LFTs recommended.   5. Additional findings above.         Electronically signed by: Tripp Bailey MD   Date:    07/23/2019   Time:    21:46      X-Ray Chest AP Portable   Final Result      1. No acute cardiopulmonary process peer         Electronically signed by: Tripp Bailey MD   Date:    07/23/2019   Time:    21:08          Surgery team feels that the patient has a superficial skin infection and recommend Clinda 300 tid x 10 days and f/u in their clinic on Thursday.  Clinical Impression:       ICD-10-CM ICD-9-CM   1. Fever, unspecified fever cause R50.9 780.60   2. Tachycardia R00.0 785.0   3. Wound infection T14.8XXA 958.3    L08.9                                 Ryan Ochoa MD  07/23/19 5076

## 2019-07-24 NOTE — DISCHARGE INSTRUCTIONS
Thank you for coming to our Emergency Department today. It is important to remember that some problems are difficult to diagnose and may not be found during your first visit. Be sure to follow up with your primary care doctor and review any labs/imaging that was performed with them. If you do not have a primary care doctor, you may contact the one listed on your discharge paperwork or you may also call the [facility] Appointment Desk at [phone] to schedule an appointment with one.     All medications may potentially have side effects and it is impossible to predict which medications may give you side effects. If you feel that you are having a negative effect of any medication you should immediately stop taking them and seek medical attention.    Return to the ER with any questions/concerns, new/concerning symptoms, worsening or failure to improve. Do not drive or make any important decisions for 24 hours if you have received any pain medications, sedatives or mood altering drugs during your ER visit.

## 2019-07-24 NOTE — CONSULTS
[facility]  General Surgery Consultation Report  2019 10:08 PM     Lucius Kohler  [id number]    CC/Reason for Consultation: fever    HPI:  38 y.o. male POD1 s/p bilateral inguinal hernia repair with mesh who presents with fever of 102.7 and incisional pain. Reports he is tolerating a diet, no BM since surgery, has pain around his incisions that radiates down to his scrotum and some mild scrotal swelling. Denies chills, nausea, emesis, diarrhea, difficulty urinating.     Past Medical History:   Diagnosis Date    Kidney stones     Skin cancer        Past Surgical History:   Procedure Laterality Date    HERNIA REPAIR      moles removed      REPAIR, HERNIA, INGUINAL, BILATERAL Bilateral 2019    Performed by O[facility]ga Mckinnon [facility] at [facility]    skin cancer surgery      WISDOM TOOTH EXTRACTION         Social History     Socioeconomic History    Marital status:      Spouse name: Not on file    Number of children: Not on file    Years of education: Not on file    Highest education level: Not on file   Occupational History    Not on file   Social Needs    Financial resource strain: Not on file    Food insecurity:     Worry: Not on file     Inability: Not on file    Transportation needs:     Medical: Not on file     Non-medical: Not on file   Tobacco Use    Smoking status: Former Smoker     Types: Cigars     Last attempt to quit: 2012     Years since quittin.5    Smokeless tobacco: Never Used   Substance and Sexual Activity    Alcohol use: Yes     Comment: occassional    Drug use: Never    Sexual activity: Yes     Partners: Female   Lifestyle    Physical activity:     Days per week: Not on file     Minutes per session: Not on file    Stress: Not on file   Relationships    Social connections:     Talks on phone: Not on file     Gets together: Not on file     Attends Faith service: Not on file     Active member of club or organization: Not on file     Attends meetings of clubs or  organizations: Not on file     Relationship status: Not on file   Other Topics Concern    Not on file   Social History Narrative    Not on file       History reviewed. No pertinent family history.    Review of patient's allergies indicates:   Allergen Reactions    Penicillins      Burning with urination after taken penicillin  Able to take keflex without any problems       ROS As per HPI otherwise complete ROS negative    Objective    Vitals:    07/23/19 1951   BP: 116/66   Pulse: 103   Resp: 18   Temp: (!) 100.9 °F (38.3 °C)         GEN: Awake, alert, resting comfortably in bed   HEENT: NCAT, anicteric  RESP: Normal WOB, no distress  CV: Regular rate, no murmurs  ABD: Soft, NTND, no guarding/rebound  ING: TTP over incisions, appropriate, some blanching erythema over incisions bilaterally, warm, no crepitus, testes normal, normal genitalia  EXT: WWP, no edema  SKIN: warm, dry  NEURO: alert, normal speech  PSYCH: normal mood and affect    Lab Results   Component Value Date    WBC 10.54 07/23/2019    HGB 13.7 (L) 07/23/2019    HCT 41.2 07/23/2019    MCV 91 07/23/2019     07/23/2019       CMP  Sodium   Date Value Ref Range Status   07/23/2019 139 136 - 145 mmol/L Final     Potassium   Date Value Ref Range Status   07/23/2019 4.3 3.5 - 5.1 mmol/L Final     Comment:     Specimen slightly hemolyzed     Chloride   Date Value Ref Range Status   07/23/2019 104 95 - 110 mmol/L Final     CO2   Date Value Ref Range Status   07/23/2019 25 23 - 29 mmol/L Final     Glucose   Date Value Ref Range Status   07/23/2019 106 70 - 110 mg/dL Final     BUN, Bld   Date Value Ref Range Status   07/23/2019 15 6 - 20 mg/dL Final     Creatinine   Date Value Ref Range Status   07/23/2019 1.3 0.5 - 1.4 mg/dL Final     Calcium   Date Value Ref Range Status   07/23/2019 9.9 8.7 - 10.5 mg/dL Final     Total Protein   Date Value Ref Range Status   07/23/2019 8.7 (H) 6.0 - 8.4 g/dL Final     Albumin   Date Value Ref Range Status   07/23/2019  4.5 3.5 - 5.2 g/dL Final     Total Bilirubin   Date Value Ref Range Status   07/23/2019 0.8 0.1 - 1.0 mg/dL Final     Comment:     For infants and newborns, interpretation of results should be based  on gestational age, weight and in agreement with clinical  observations.  Premature Infant recommended reference ranges:  Up to 24 hours.............<8.0 mg/dL  Up to 48 hours............<12.0 mg/dL  3-5 days..................<15.0 mg/dL  6-29 days.................<15.0 mg/dL       Alkaline Phosphatase   Date Value Ref Range Status   07/23/2019 91 55 - 135 U/L Final     AST   Date Value Ref Range Status   07/23/2019 25 10 - 40 U/L Final     ALT   Date Value Ref Range Status   07/23/2019 27 10 - 44 U/L Final     Anion Gap   Date Value Ref Range Status   07/23/2019 10 8 - 16 mmol/L Final     eGFR if    Date Value Ref Range Status   07/23/2019 >60 >60 mL/min/1.73 m^2 Final     eGFR if non    Date Value Ref Range Status   07/23/2019 >60 >60 mL/min/1.73 m^2 Final     Comment:     Calculation used to obtain the estimated glomerular filtration  rate (eGFR) is the CKD-EPI equation.          Imaging Results          CT Abdomen Pelvis With Contrast (Final result)  Result time 07/23/19 21:46:37    Final result by Tripp Bailey MD (07/23/19 21:46:37)                 Impression:      1. Interval surgical change of hernia repair, without findings to suggest focal oral nice fluid collection or large hematoma.  2. Urinary bladder wall thickening, nonspecific, correlation with urinalysis recommended noting there is mild right urothelial thickening.  3. Findings suggesting developing constipation.  4. Possible hepatic steatosis, correlation with LFTs recommended.  5. Additional findings above.      Electronically signed by: Tripp Bailey MD  Date:    07/23/2019  Time:    21:46             Narrative:    EXAMINATION:  CT ABDOMEN PELVIS WITH CONTRAST    CLINICAL HISTORY:  fever;    TECHNIQUE:  Low dose  axial images, sagittal and coronal reformations were obtained from the lung bases to the pubic symphysis following the IV administration of 75 mL of Omnipaque 350 .  Oral contrast was not given.    COMPARISON:  05/15/2019    FINDINGS:  Images of the lower thorax are remarkable for bilateral dependent atelectasis/scarring.  There is right gynecomastia.    The liver is hypoattenuating, may reflect steatosis, correlation with LFTs recommended.  The spleen, pancreas, gallbladder and adrenal glands are grossly unremarkable.  There is no biliary dilation or ascites.  The stomach is decompressed.  No significant abdominal lymphadenopathy.  The portal vein, splenic vein, SMV, celiac axis and SMA all are patent.  Pancreatic duct is not dilated.    The kidneys enhance symmetrically.  There is right nonobstructive nephrolithiasis, no definite left nephrolithiasis.  No hydronephrosis.  The bilateral ureters are unremarkable without calculi seen.  The urinary bladder wall is thickened, correlation with urinalysis recommended.  There is some mild urothelial thickening involving the distal right ureter.  The prostate is not enlarged.    There is moderate stool in the colon, without wall thickening or surrounding inflammation.  The terminal ileum and appendix are unremarkable.  The small bowel is grossly unremarkable.  No focal organized pelvic fluid collection.    Degenerative changes are noted of the spine.  No significant inguinal lymphadenopathy.    There is soft tissue induration involving the anterior pelvic wall, in this patient status post inguinal hernia repair.  Several regions of subcutaneous emphysema are noted within the subcutaneous tissues, inguinal canals, and within the anterior aspect of the pelvis.  No findings to suggest large focal fluid collection or rectus hematoma.  No significant residual hernia.                               X-Ray Chest AP Portable (Final result)  Result time 07/23/19 21:08:15    Final  result by Tripp Bailey MD (07/23/19 21:08:15)                 Impression:      1. No acute cardiopulmonary process peer      Electronically signed by: Tripp Bailey MD  Date:    07/23/2019  Time:    21:08             Narrative:    EXAMINATION:  XR CHEST AP PORTABLE    CLINICAL HISTORY:  Sepsis;    TECHNIQUE:  Single frontal view of the chest was performed.    COMPARISON:  08/29/2016    FINDINGS:  The cardiomediastinal silhouette is not enlarged.  There is no pleural effusion.  The trachea is midline.  The lungs are symmetrically expanded bilaterally without evidence of acute parenchymal process. No large focal consolidation seen.  There is no pneumothorax.  The osseous structures are unremarkable.                                  A/P: 38 y.o. male POD1 s/p bilateral open inguinal hernia repair with mesh who presents with incisional pain and fever to 102. WBC wnl. Has blanching erythema by incisions bilaterally concerning for possible surgical site infection. CT wnl. UA negative  - clindamycin 300 mg TID PO x 10 days  - follow up with Dr. Mckinnon Thursday morning in clinic  - instructed patient to return to ED if erythema worsens quickly or has fever over 101.5      M. Elizabeth Clifton MD   Gen Surgery PGY5

## 2019-07-25 ENCOUNTER — OFFICE VISIT (OUTPATIENT)
Dept: SURGERY | Facility: CLINIC | Age: 39
End: 2019-07-25
Payer: COMMERCIAL

## 2019-07-25 VITALS
SYSTOLIC BLOOD PRESSURE: 122 MMHG | WEIGHT: 190 LBS | BODY MASS INDEX: 28.79 KG/M2 | HEART RATE: 78 BPM | DIASTOLIC BLOOD PRESSURE: 86 MMHG | HEIGHT: 68 IN

## 2019-07-25 DIAGNOSIS — K40.20 NON-RECURRENT BILATERAL INGUINAL HERNIA WITHOUT OBSTRUCTION OR GANGRENE: Primary | ICD-10-CM

## 2019-07-25 PROCEDURE — 99024 POSTOP FOLLOW-UP VISIT: CPT | Mod: S$GLB,,, | Performed by: SURGERY

## 2019-07-25 PROCEDURE — 99024 PR POST-OP FOLLOW-UP VISIT: ICD-10-PCS | Mod: S$GLB,,, | Performed by: SURGERY

## 2019-07-25 NOTE — PROGRESS NOTES
Subjective:       Patient ID: Lucius Kohler is a 38 y.o. male.    Chief Complaint: Post-op Evaluation    HPI 37 yo male with recent bilateral inguinal hernia repair with some post op issues that have resolved  Review of Systems   Constitutional: Negative.    HENT: Negative.    Eyes: Negative.    Respiratory: Negative.    Cardiovascular: Negative.    Gastrointestinal: Negative.    Endocrine: Negative.    Musculoskeletal: Negative.    Skin: Negative.    Allergic/Immunologic: Negative.    Neurological: Negative.    Hematological: Negative.    Psychiatric/Behavioral: Negative.    All other systems reviewed and are negative.      Objective:      Physical Exam   Constitutional: He is oriented to person, place, and time. He appears well-developed and well-nourished.   HENT:   Head: Normocephalic and atraumatic.   Right Ear: External ear normal.   Left Ear: External ear normal.   Nose: Nose normal.   Mouth/Throat: Oropharynx is clear and moist.   Eyes: Pupils are equal, round, and reactive to light. Conjunctivae and EOM are normal.   Neck: Normal range of motion. Neck supple.   Cardiovascular: Normal rate, regular rhythm, normal heart sounds and intact distal pulses.   Pulmonary/Chest: Effort normal and breath sounds normal.   Abdominal: Soft. Bowel sounds are normal.   Genitourinary:         Musculoskeletal: Normal range of motion.   Neurological: He is alert and oriented to person, place, and time. He has normal reflexes.   Skin: Skin is warm and dry.   Psychiatric: He has a normal mood and affect. His behavior is normal. Thought content normal.   Vitals reviewed.      Assessment:       1. Non-recurrent bilateral inguinal hernia without obstruction or gangrene      doing well  Plan:       I will see him back prn

## 2019-07-28 LAB
BACTERIA BLD CULT: NORMAL
BACTERIA BLD CULT: NORMAL

## 2019-07-29 ENCOUNTER — PATIENT MESSAGE (OUTPATIENT)
Dept: SURGERY | Facility: CLINIC | Age: 39
End: 2019-07-29

## 2021-04-16 ENCOUNTER — PATIENT MESSAGE (OUTPATIENT)
Dept: RESEARCH | Facility: HOSPITAL | Age: 41
End: 2021-04-16

## 2021-08-03 ENCOUNTER — LAB VISIT (OUTPATIENT)
Dept: OBSTETRICS AND GYNECOLOGY | Facility: CLINIC | Age: 41
End: 2021-08-03
Payer: COMMERCIAL

## 2021-08-03 ENCOUNTER — OFFICE VISIT (OUTPATIENT)
Dept: FAMILY MEDICINE | Facility: CLINIC | Age: 41
End: 2021-08-03
Payer: COMMERCIAL

## 2021-08-03 DIAGNOSIS — R05.9 COUGH: Primary | ICD-10-CM

## 2021-08-03 DIAGNOSIS — R05.9 COUGH: ICD-10-CM

## 2021-08-03 DIAGNOSIS — G44.201 INTRACTABLE TENSION-TYPE HEADACHE, UNSPECIFIED CHRONICITY PATTERN: ICD-10-CM

## 2021-08-03 PROCEDURE — 99203 OFFICE O/P NEW LOW 30 MIN: CPT | Mod: 95,,, | Performed by: INTERNAL MEDICINE

## 2021-08-03 PROCEDURE — U0005 INFEC AGEN DETEC AMPLI PROBE: HCPCS | Performed by: INTERNAL MEDICINE

## 2021-08-03 PROCEDURE — 1160F PR REVIEW ALL MEDS BY PRESCRIBER/CLIN PHARMACIST DOCUMENTED: ICD-10-PCS | Mod: CPTII,,, | Performed by: INTERNAL MEDICINE

## 2021-08-03 PROCEDURE — 1159F PR MEDICATION LIST DOCUMENTED IN MEDICAL RECORD: ICD-10-PCS | Mod: CPTII,,, | Performed by: INTERNAL MEDICINE

## 2021-08-03 PROCEDURE — U0003 INFECTIOUS AGENT DETECTION BY NUCLEIC ACID (DNA OR RNA); SEVERE ACUTE RESPIRATORY SYNDROME CORONAVIRUS 2 (SARS-COV-2) (CORONAVIRUS DISEASE [COVID-19]), AMPLIFIED PROBE TECHNIQUE, MAKING USE OF HIGH THROUGHPUT TECHNOLOGIES AS DESCRIBED BY CMS-2020-01-R: HCPCS | Performed by: INTERNAL MEDICINE

## 2021-08-03 PROCEDURE — 1160F RVW MEDS BY RX/DR IN RCRD: CPT | Mod: CPTII,,, | Performed by: INTERNAL MEDICINE

## 2021-08-03 PROCEDURE — 1159F MED LIST DOCD IN RCRD: CPT | Mod: CPTII,,, | Performed by: INTERNAL MEDICINE

## 2021-08-03 PROCEDURE — 99203 PR OFFICE/OUTPT VISIT, NEW, LEVL III, 30-44 MIN: ICD-10-PCS | Mod: 95,,, | Performed by: INTERNAL MEDICINE

## 2021-08-04 ENCOUNTER — PATIENT MESSAGE (OUTPATIENT)
Dept: FAMILY MEDICINE | Facility: CLINIC | Age: 41
End: 2021-08-04

## 2021-08-04 LAB
SARS-COV-2 RNA RESP QL NAA+PROBE: NOT DETECTED
SARS-COV-2- CYCLE NUMBER: -1

## 2021-08-04 RX ORDER — AZITHROMYCIN 250 MG/1
TABLET, FILM COATED ORAL
Qty: 6 TABLET | Refills: 0 | Status: SHIPPED | OUTPATIENT
Start: 2021-08-04 | End: 2021-08-09

## 2025-01-08 LAB — CRC RECOMMENDATION EXT: NORMAL

## 2025-02-09 ENCOUNTER — HOSPITAL ENCOUNTER (EMERGENCY)
Facility: HOSPITAL | Age: 45
Discharge: HOME | End: 2025-02-09
Attending: EMERGENCY MEDICINE
Payer: COMMERCIAL

## 2025-02-09 VITALS
RESPIRATION RATE: 18 BRPM | BODY MASS INDEX: 32.58 KG/M2 | DIASTOLIC BLOOD PRESSURE: 78 MMHG | OXYGEN SATURATION: 99 % | HEART RATE: 86 BPM | HEIGHT: 68 IN | WEIGHT: 215 LBS | SYSTOLIC BLOOD PRESSURE: 123 MMHG | TEMPERATURE: 98 F

## 2025-02-09 DIAGNOSIS — S01.01XA SCALP LACERATION, INITIAL ENCOUNTER: ICD-10-CM

## 2025-02-09 DIAGNOSIS — Z78.9 ALCOHOL USE: ICD-10-CM
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
## 2025-02-09 LAB — ETHANOL SERPL-MCNC: 225 MG/DL

## 2025-02-09 PROCEDURE — 63600175 PHARM REV CODE 636 W HCPCS: Performed by: EMERGENCY MEDICINE

## 2025-02-09 PROCEDURE — 99284 EMERGENCY DEPT VISIT MOD MDM: CPT | Mod: 25

## 2025-02-09 PROCEDURE — 82077 ASSAY SPEC XCP UR&BREATH IA: CPT | Performed by: EMERGENCY MEDICINE

## 2025-02-09 PROCEDURE — 12001 RPR S/N/AX/GEN/TRNK 2.5CM/<: CPT

## 2025-02-09 RX ORDER — LIDOCAINE HYDROCHLORIDE 10 MG/ML
5 INJECTION, SOLUTION INFILTRATION; PERINEURAL
Status: COMPLETED | OUTPATIENT
Start: 2025-02-09 | End: 2025-02-09

## 2025-02-09 RX ADMIN — LIDOCAINE HYDROCHLORIDE 5 ML: 10 INJECTION, SOLUTION INFILTRATION; PERINEURAL at 10:02

## 2025-02-10 NOTE — ED PROVIDER NOTES
Encounter Date: 2025    SCRIBE #1 NOTE: I, Arlen Frida, am scribing for, and in the presence of,  Vitaliy Bustamante MD.       History     Chief Complaint   Patient presents with    Fall     Pt reported to the ED with a CC of ground level fall with no LOC. The PT reported ETOH.      45 yo M w/ PMHx of kidney stones presenting to the ED for head trauma s/p fall occurring PTA today. Patient was at the [facility] and consumed large amounts of alcohol today. He accidentally suffered a ground level fall, hitting his posterior head. Per EMS, he sustained a wound to his posterior head. No acute LOC, neck pain, or back pain. No other exacerbating or alleviating factors. No anticoagulation use.     The history is provided by the patient and the EMS personnel.     Review of patient's allergies indicates:   Allergen Reactions    Penicillins      Burning with urination after taken penicillin  Able to take keflex without any problems     Past Medical History:   Diagnosis Date    Kidney stones     Skin cancer      Past Surgical History:   Procedure Laterality Date    BILATERAL INGUINAL HERNIA REPAIR Bilateral 2019    Procedure: REPAIR, HERNIA, INGUINAL, BILATERAL;  Surgeon: Ortega Mckinnon MD;  Location: [facility];  Service: General;  Laterality: Bilateral;  RN PRE OP 19-----NEED H/P    HERNIA REPAIR      moles removed      skin cancer surgery      WISDOM TOOTH EXTRACTION       No family history on file.  Social History     Tobacco Use    Smoking status: Former     Types: Cigars     Quit date:      Years since quittin.1    Smokeless tobacco: Never   Substance Use Topics    Alcohol use: Yes     Comment: occassional    Drug use: Never     Review of Systems   Constitutional: Negative.  Negative for chills and fever.   HENT: Negative.  Negative for congestion, rhinorrhea and sore throat.    Eyes: Negative.  Negative for visual disturbance.   Respiratory: Negative.  Negative for cough and shortness of breath.     Cardiovascular: Negative.  Negative for chest pain.   Gastrointestinal: Negative.  Negative for abdominal pain, diarrhea, nausea and vomiting.   Endocrine: Negative.    Genitourinary: Negative.  Negative for dysuria, frequency and hematuria.   Musculoskeletal: Negative.  Negative for back pain and neck pain.   Skin:  Positive for wound (posterior head). Negative for rash.   Allergic/Immunologic: Negative.    Neurological: Negative.  Negative for dizziness, syncope, weakness and headaches.   Hematological: Negative.  Does not bruise/bleed easily.   Psychiatric/Behavioral: Negative.         Physical Exam     Initial Vitals [02/09/25 2037]   BP Pulse Resp Temp SpO2   117/84 88 18 97.9 °F (36.6 °C) 100 %      MAP       --         Physical Exam    Nursing note and vitals reviewed.  Constitutional: Vital signs are normal. He appears well-developed and well-nourished. He is active and cooperative. Cervical collar in place.   HENT:   Head: Normocephalic and atraumatic.   Eyes: Conjunctivae, EOM and lids are normal. Pupils are equal, round, and reactive to light.   Neck: Trachea normal. Neck supple. No thyroid mass and no thyromegaly present.    Full passive range of motion without pain.     Cardiovascular:  Normal rate, regular rhythm, S1 normal, S2 normal, normal heart sounds, intact distal pulses and normal pulses.           Pulmonary/Chest: Effort normal and breath sounds normal. No respiratory distress.   Abdominal: Abdomen is soft. Bowel sounds are normal. There is no splenomegaly or hepatomegaly. There is no abdominal tenderness. No hernia.   No right CVA tenderness.  No left CVA tenderness. There is no rebound, no guarding, no tenderness at McBurney's point and negative Shine's sign.   Musculoskeletal:         General: No edema. Normal range of motion.      Cervical back: Full passive range of motion without pain and neck supple.     Lymphadenopathy:     He has no axillary adenopathy.   Neurological: He is alert  and oriented to person, place, and time. GCS score is 15. GCS eye subscore is 4. GCS verbal subscore is 5. GCS motor subscore is 6.   Skin: Skin is warm, dry and intact. Capillary refill takes less than 2 seconds.   Psychiatric: He has a normal mood and affect. His speech is normal and behavior is normal. Judgment and thought content normal. Cognition and memory are normal.         ED Course   Lac Repair    Date/Time: 2/9/2025 10:08 PM    Performed by: Vitaliy Bustamante MD  Authorized by: Vitaliy Bustamante MD    Consent:     Consent obtained:  Verbal and emergent situation    Consent given by:  Patient    Risks, benefits, and alternatives were discussed: yes      Risks discussed:  Infection, pain, poor cosmetic result, need for additional repair, nerve damage and poor wound healing    Alternatives discussed:  No treatment, delayed treatment and observation  Universal protocol:     Procedure explained and questions answered to patient or proxy's satisfaction: yes      Relevant documents present and verified: yes      Test results available: yes      Imaging studies available: yes      Required blood products, implants, devices, and special equipment available: yes      Site/side marked: yes      Immediately prior to procedure, a time out was called: yes      Patient identity confirmed:  Verbally with patient and arm band  Anesthesia:     Anesthesia method:  Local infiltration    Local anesthetic:  Lidocaine 1% w/o epi  Laceration details:     Location:  Scalp    Scalp location:  Frontal    Length (cm):  2.5    Depth (mm):  5  Pre-procedure details:     Preparation:  Patient was prepped and draped in usual sterile fashion and imaging obtained to evaluate for foreign bodies  Exploration:     Limited defect created (wound extended): no      Hemostasis achieved with:  Direct pressure    Imaging obtained: x-ray      Imaging obtained comment:  CT    Wound exploration: wound explored through full range of motion and  entire depth of wound visualized      Contaminated: no    Treatment:     Area cleansed with:  Saline    Amount of cleaning:  Standard    Irrigation solution:  Sterile saline    Irrigation volume:  150    Irrigation method:  Syringe    Visualized foreign bodies/material removed: yes      Debridement:  None    Undermining:  None    Scar revision: no    Skin repair:     Repair method:  Staples    Number of staples:  7  Approximation:     Approximation:  Close  Repair type:     Repair type:  Simple  Post-procedure details:     Dressing:  Adhesive bandage    Procedure completion:  Tolerated well, no immediate complications    Labs Reviewed   ALCOHOL,MEDICAL (ETHANOL) - Abnormal       Result Value    Alcohol, Serum 225 (*)           Imaging Results              CT Cervical Spine Without Contrast (Final result)  Result time 02/09/25 21:46:57      Final result by Clinton Tovar MD (02/09/25 21:46:57)                   Impression:      No evidence of acute cervical spine fracture or dislocation.      Electronically signed by: Clinton Tovar MD  Date:    02/09/2025  Time:    21:46               Narrative:    EXAMINATION:  CT CERVICAL SPINE WITHOUT CONTRAST    CLINICAL HISTORY:  Neck trauma, dangerous injury mechanism (Age 16-64y);etoh;    TECHNIQUE:  Low dose axial images, sagittal and coronal reformations were performed though the cervical spine.  Contrast was not administered.    COMPARISON:  None    FINDINGS:  No evidence of acute cervical spine fracture or dislocation.  Odontoid process is intact.  Craniocervical junction is unremarkable.  Cervical spine alignment is within normal limits.    Surrounding soft tissues show no significant abnormalities.  Airway is patent.  Partially visualized lung apices are clear.                                       CT Head Without Contrast (Final result)  Result time 02/09/25 21:45:40      Final result by Clinton Tovar MD (02/09/25 21:45:40)                   Impression:       No acute intracranial abnormalities identified.      Electronically signed by: Clinton Tovar MD  Date:    02/09/2025  Time:    21:45               Narrative:    EXAMINATION:  CT HEAD WITHOUT CONTRAST    CLINICAL HISTORY:  Head trauma, skull fracture or hematoma (Age 19-64y);etoh;    TECHNIQUE:  Low dose axial images were obtained through the head.  Coronal and sagittal reformations were also performed. Contrast was not administered.    COMPARISON:  CT head from August 2016.    FINDINGS:  No evidence of acute/recent major vascular distribution cerebral infarction, intraparenchymal hemorrhage, or intra-axial space occupying lesion. The ventricular system is normal in size and configuration with no evidence of hydrocephalus. No effacement of the skull-base cisterns. No abnormal extra-axial fluid collections or blood products. Visualized paranasal sinuses and mastoid air cells are clear. The calvarium shows no significant abnormality.  Laceration is seen involving the right lateral scalp region with small foci of soft tissue air seen.                                       Medications   LIDOcaine HCL 10 mg/ml (1%) injection 5 mL (has no administration in time range)     Medical Decision Making  Patient presented secondary to a fall while drinking down in the quarters.  Patient denied any LOC denied any neck or any other pain.  Patient's CT head and neck is unremarkable particular no foreign bodies identified on CT.  Patient's subsequently repaired with staples and tolerated this procedure well.  Patient will be discharged home in the accompaniment of his wife.    Amount and/or Complexity of Data Reviewed  Independent Historian: EMS     Details: See HPI   Radiology: ordered. Decision-making details documented in ED Course.    Risk  Prescription drug management.            Scribe Attestation:   Scribe #1: I performed the above scribed service and the documentation accurately describes the services I performed. I attest  to the accuracy of the note.        ED Course as of 02/09/25 2213   Sun Feb 09, 2025   2210 ETOH of 225.  Patient is awake alert and appropriate in conversation at this time.  Patient's wife is here will take patient home.  Patient's vital signs are all within normal limits. [MI]      ED Course User Index  [MI] Vitaliy Bustamante MD                         This document was produced by a scribe under my direction and in my presence. I agree with the content of the note and have made any necessary edits.     Vitaliy Bustamante MD    02/09/2025 10:13 PM        DISCLAIMER: This note was prepared with [employer] voice recognition transcription software. Garbled syntax, mangled pronouns, and other bizarre constructions may be attributed to that software system.        Clinical Impression:  Final diagnoses:  [W19.XXXA] Fall, initial encounter (Primary)  [S01.01XA] Scalp laceration, initial encounter  [Z78.9] Alcohol use          ED Disposition Condition    Discharge Stable          ED Prescriptions    None       Follow-up Information       Follow up With Specialties Details Why Contact Info    Eric Lantigua MD Family Medicine In 1 week For suture removal [address] [facility] [address]  [phone]      [facility] Emergency Medicine In 1 week For suture removal [address] [facility] [place] [address] [phone]             Vitaliy Bustamante MD  02/09/25 2213

## 2025-02-17 ENCOUNTER — OFFICE VISIT (OUTPATIENT)
Dept: PRIMARY CARE CLINIC | Facility: CLINIC | Age: 45
End: 2025-02-17
Payer: COMMERCIAL

## 2025-02-17 VITALS
RESPIRATION RATE: 16 BRPM | OXYGEN SATURATION: 98 % | HEART RATE: 77 BPM | WEIGHT: 217.94 LBS | SYSTOLIC BLOOD PRESSURE: 132 MMHG | DIASTOLIC BLOOD PRESSURE: 80 MMHG | HEIGHT: 68 IN | BODY MASS INDEX: 33.03 KG/M2 | TEMPERATURE: 98 F

## 2025-02-17 DIAGNOSIS — Z82.49 FAMILY HISTORY OF HEART DISEASE: ICD-10-CM

## 2025-02-17 DIAGNOSIS — E78.01 FAMILIAL HYPERCHOLESTEROLEMIA: ICD-10-CM

## 2025-02-17 DIAGNOSIS — Z00.00 ANNUAL PHYSICAL EXAM: ICD-10-CM

## 2025-02-17 DIAGNOSIS — E66.811 OBESITY, CLASS I, BMI 30-34.9: ICD-10-CM

## 2025-02-17 DIAGNOSIS — Z11.59 NEED FOR HEPATITIS C SCREENING TEST: ICD-10-CM

## 2025-02-17 DIAGNOSIS — Z11.4 ENCOUNTER FOR SCREENING FOR HIV: ICD-10-CM

## 2025-02-17 DIAGNOSIS — Z80.42 FAMILY HX OF PROSTATE CANCER: ICD-10-CM

## 2025-02-17 DIAGNOSIS — Z48.02 VISIT FOR SUTURE REMOVAL: ICD-10-CM

## 2025-02-17 DIAGNOSIS — Z79.899 OTHER LONG TERM (CURRENT) DRUG THERAPY: ICD-10-CM

## 2025-02-17 DIAGNOSIS — Z76.89 ENCOUNTER TO ESTABLISH CARE: Primary | ICD-10-CM

## 2025-02-17 PROBLEM — E55.9 VITAMIN D DEFICIENCY: Status: ACTIVE | Noted: 2020-10-20

## 2025-02-17 RX ORDER — ASPIRIN 81 MG/1
81 TABLET ORAL DAILY
COMMUNITY
Start: 2019-01-01

## 2025-02-17 RX ORDER — CHOLECALCIFEROL (VITAMIN D3) 25 MCG
1000 TABLET ORAL DAILY
COMMUNITY

## 2025-02-17 NOTE — PROGRESS NOTES
Assessment:       1. Encounter to establish care    2. Visit for suture removal    3. Annual physical exam    4. Obesity, Class I, BMI 30-34.9    5. Familial hypercholesterolemia    6. Family history of heart disease    7. Other long term (current) drug therapy    8. Need for hepatitis C screening test    9. Encounter for screening for HIV    10. Family hx of prostate cancer           Plan:     Assessment & Plan    Z76.89 Encounter to establish care  Z48.02 Visit for suture removal  Z00.00 Annual physical exam  E78.01 Familial hypercholesterolemia  Z82.49 Family history of heart disease  Z79.899 Other long term (current) drug therapy  Z11.59 Need for hepatitis C screening test  Z11.4 Encounter for screening for HIV  Z80.42 Family hx of prostate cancer    IMPRESSION:  - Assessed head injury and potential concussion symptoms 1 week post-incident  - Evaluated staple removal site; found no concerning inflammation or discharge  - Considered PSA screening due to family history of prostate cancer, despite patient being under typical screening age of 48  - Reviewed recent colonoscopy results and follow-up recommendations  - Ordered routine labs including lipid panel to reassess previously elevated cholesterol levels             Encounter to establish care    Visit for suture removal  -   CBC, CMP, TSH, Lipids, A1c, HepC and HIV.    Annual physical exam  -   CBC, CMP, TSH, Lipids, A1c, HepC and HIV.    Familial hypercholesterolemia   - family history.   - recheck cholesterol.    Family history of heart disease   - vitals stable.   - checking labs.     Weight Loss:   - Body mass index is 33.14 kg/m².   - Normal weight is BMI 18-24.9.     - Overweight: 25-29.9  - Class 1 Obesity: 30-34.9  - Class 2 Obesity: 35-39.9  - Class 3 Obesity: 40+  - A BMI under 18 also shows diminished health outcomes.   - best health outcomes have been seen at a BMI of 22.    - excess weigh affects many body systems, including: cardiac,  "respiratory, GI, endocrine, musculoskeletal, dermatologic, reproductive, mental health and more.   - recommended moderate weight change, 1-2lbs per weeks.   - focus on eating a healthy sustainable diet.  Use food diary for at least a couple weeks to better understand what your diet.   - consider jimmy such as "Lose It" or "Noom".   - avoid empty calories that you may use daily from items such as like soda, sweet tea, sugary coffee, ice cream, cake/pie, cookies/brownies/crackers or candy.  An occasional piece of birthday cake is not the cause of obesity, but a daily Frappaccino could be to blame.    - "Low Fat" on a box or bag should be eyed with caution, this fat it typically replaced with forms of sugar/carbs and the resultant product may be less healthy than other products.   - when possible eating whole foods is almost always preferable.  A diet of salads, green beans, broccoli, cauliflower, cucumbers, sweet potatoes, peppers, olives/avocados, tomatoes, beats, berries, eggs, meat/fish, shrimp/crawfish with some limited fruit (apples/oranges/bananas/grapes) and even more limited grains/starches (pasta/rice/bread/potatoes/cereal) will serve you much better in the long term than eating a bunch of diet bars, shakes or powders.     - Exercise has many benefits (heart health, improved mood/energy, higher self esteem, less depression, greater strength/flexibility, better sleep, less stress/anxiety, improved immune system, stronger bones, improved cognition, fewer colds/asthma exacerbations), it also does help lose weight.  But weight loss from exercise is much less impactful than when a change in diet can achieve.  Exercise is highly encouraged, but diet change should be the primary tool used to lose weight.       Other long term (current) drug therapy  -   CBC, CMP, TSH, Lipids, A1c, HepC and HIV.    Need for hepatitis C screening test  -     Hepatitis C Antibody; Future; Expected date: 02/17/2025    Encounter for " screening for HIV  -     HIV 1/2 Ag/Ab (4th Gen); Future; Expected date: 02/17/2025    Family hx of prostate cancer  -     PSA, Screening; Future; Expected date: 02/17/2025                This note was generated with the assistance of ambient listening technology. Verbal consent was obtained by the patient and accompanying visitor(s) for the recording of patient appointment to facilitate this note. I attest to having reviewed and edited the generated note for accuracy, though some syntax or spelling errors may persist. Please contact the author of this note for any clarification.      Subjective:           Patient ID: Lucius Kohler   Age:  44 y.o.  Sex: male     Chief Complaint:   Establish Care, Suture / Staple Removal (head), Headache (Feels like he has a concussion), and Fall      History of Present Illness:    Lucius Kohlre is a 44 y.o. male who presents today with a chief complaint of Establish Care, Suture / Staple Removal (head), Headache (Feels like he has a concussion), and Fall  .            History of Present Illness    CHIEF COMPLAINT:  Lucius presents today for staple removal following a head injury.    HISTORY OF PRESENT ILLNESS:  He sustained a head injury one week ago at [employer] after missing a step and hitting his head on a stool. He is experiencing post-injury concussion symptoms including headache, feeling sluggish, and sensitivity to light. These symptoms are similar to his previous concussions from 2010 and high school football. His symptoms are exacerbated by extended periods of activity, such as attending his daughter's theater practice and dance lessons, which caused pressure and headache. He also reports feeling overwhelmed when taking his family to see a show downtown.    PAST MEDICAL HISTORY:  He has a history of high cholesterol previously managed with dietary adjustments, and two prior concussions (2010 and during high school football). His current weight is 212 lbs, down from 225  lbs.    SURGICAL HISTORY:  History includes wisdom teeth extraction, bilateral inguinal hernia repair in 2019, and mole removal.    FAMILY HISTORY:  Mother has history of colon and skin cancer. Father had myocardial infarction at age 65. Maternal grandmother had liver cancer, maternal great-grandmother had skin cancer, and paternal uncle had prostate cancer.    MEDICATIONS:  He takes aspirin 81 mg, multivitamin, and vitamin D daily. He has been taking Tylenol as needed for pain but has not taken it in the past 2 days.    ALLERGIES:  He has an allergy to penicillin.    SOCIAL HISTORY:  He lives with his wife and two children, ages 17 and 10. He is a former smoker who had a few cigars between ages [age], with last use approximately 4 years ago. He denies daily alcohol consumption but reports social drinking with friends, typically consuming up to 4 drinks on occasions.      ROS:  Neurological: +headache           Review of Systems   Constitutional: Negative.  Negative for fatigue and fever.   HENT: Negative.  Negative for congestion, postnasal drip, rhinorrhea and sinus pressure.    Eyes: Negative.    Respiratory: Negative.  Negative for cough, shortness of breath and wheezing.    Cardiovascular: Negative.  Negative for chest pain, palpitations and leg swelling.   Gastrointestinal: Negative.  Negative for constipation, diarrhea, nausea and vomiting.   Endocrine: Negative.    Genitourinary: Negative.  Negative for difficulty urinating, flank pain and urgency.   Musculoskeletal: Negative.  Negative for back pain, gait problem, joint swelling and myalgias.   Skin:  Positive for wound. Negative for rash.   Allergic/Immunologic: Negative for food allergies.   Neurological:  Positive for light-headedness and headaches (mild intermittent). Negative for dizziness, weakness and numbness.   Psychiatric/Behavioral: Negative.  Negative for sleep disturbance. The patient is not nervous/anxious.            Objective:        Vitals:  "   02/17/25 1122   BP: 132/80   BP Location: Right arm   Patient Position: Sitting   Pulse: 77   Resp: 16   Temp: 98.3 °F (36.8 °C)   TempSrc: Oral   SpO2: 98%   Weight: 98.9 kg (217 lb 14.8 oz)   Height: 5' 8" (1.727 m)       Body mass index is 33.14 kg/m².      Physical Exam  Vitals reviewed.   Constitutional:       General: He is not in acute distress.     Appearance: Normal appearance. He is not ill-appearing.      Comments: As per BMI.   HENT:      Head: Normocephalic and atraumatic.      Right Ear: External ear normal.      Left Ear: External ear normal.      Nose: Nose normal.   Eyes:      Extraocular Movements: Extraocular movements intact.      Conjunctiva/sclera: Conjunctivae normal.   Cardiovascular:      Rate and Rhythm: Normal rate.      Pulses: Normal pulses.   Pulmonary:      Effort: Pulmonary effort is normal. No respiratory distress.      Breath sounds: No wheezing.   Musculoskeletal:         General: No swelling or deformity.      Cervical back: Normal range of motion.      Right lower leg: No edema.      Left lower leg: No edema.   Skin:     Capillary Refill: Capillary refill takes less than 2 seconds.      Findings: Lesion present.      Comments: 10 skin staples removed from right side of scalp without bleeding or issue.   Neurological:      General: No focal deficit present.      Mental Status: He is alert and oriented to person, place, and time.      Gait: Gait normal.   Psychiatric:         Mood and Affect: Mood normal.         Physical Exam    Skin: Scabbing present around staple removal sites.               Past Medical History[1]    Lab Results   Component Value Date     07/23/2019    K 4.3 07/23/2019     07/23/2019    CO2 25 07/23/2019    BUN 15 07/23/2019    CREATININE 1.3 07/23/2019    ANIONGAP 10 07/23/2019     No results found for: "HGBA1C"  No results found for: "BNP", "BNPTRIAGEBLO"    Lab Results   Component Value Date    WBC 10.54 07/23/2019    HGB 13.7 (L) 07/23/2019 " "   HCT 41.2 07/23/2019    HCT 35 (L) 05/15/2019     07/23/2019    GRAN 7.9 (H) 07/23/2019    GRAN 75.1 (H) 07/23/2019     No results found for: "CHOL", "HDL", "LDLCALC", "TRIG"     Encounter Medications[2]              [1]   Past Medical History:  Diagnosis Date    Kidney stones     Skin cancer    [2]   Outpatient Encounter Medications as of 2/17/2025   Medication Sig Dispense Refill    aspirin (ECOTRIN) 81 MG EC tablet Take 81 mg by mouth once daily.      multivit,giana,mn/folic/D3/lycop (ONE A DAY MEN COMPLETE ORAL) Take 1 tablet by mouth once daily.      vitamin D (VITAMIN D3) 1000 units Tab Take 1,000 Units by mouth once daily.       No facility-administered encounter medications on file as of 2/17/2025.     "

## 2025-02-17 NOTE — PATIENT INSTRUCTIONS
"  Assessment & Plan    Z76.89 Encounter to establish care  Z48.02 Visit for suture removal  Z00.00 Annual physical exam  E78.01 Familial hypercholesterolemia  Z82.49 Family history of heart disease  Z79.899 Other long term (current) drug therapy  Z11.59 Need for hepatitis C screening test  Z11.4 Encounter for screening for HIV  Z80.42 Family hx of prostate cancer    IMPRESSION:  - Assessed head injury and potential concussion symptoms 1 week post-incident  - Evaluated staple removal site; found no concerning inflammation or discharge  - Considered PSA screening due to family history of prostate cancer, despite patient being under typical screening age of 48  - Reviewed recent colonoscopy results and follow-up recommendations  - Ordered routine labs including lipid panel to reassess previously elevated cholesterol levels       Familial hypercholesterolemia    Family history of heart disease    Family hx of prostate cancer  -     PSA, Screening; Future; Expected date: 02/17/2025    Weight Loss:   - Body mass index is 33.14 kg/m².   - Normal weight is BMI 18-24.9.     - Overweight: 25-29.9  - Class 1 Obesity: 30-34.9  - Class 2 Obesity: 35-39.9  - Class 3 Obesity: 40+  - A BMI under 18 also shows diminished health outcomes.   - best health outcomes have been seen at a BMI of 22.    - excess weigh affects many body systems, including: cardiac, respiratory, GI, endocrine, musculoskeletal, dermatologic, reproductive, mental health and more.   - recommended moderate weight change, 1-2lbs per weeks.   - focus on eating a healthy sustainable diet.  Use food diary for at least a couple weeks to better understand what your diet.   - consider jimmy such as "Lose It" or "Noom".   - avoid empty calories that you may use daily from items such as like soda, sweet tea, sugary coffee, ice cream, cake/pie, cookies/brownies/crackers or candy.  An occasional piece of birthday cake is not the cause of obesity, but a daily Frappaccino could " "be to blame.    - "Low Fat" on a box or bag should be eyed with caution, this fat it typically replaced with forms of sugar/carbs and the resultant product may be less healthy than other products.   - when possible eating whole foods is almost always preferable.  A diet of salads, green beans, broccoli, cauliflower, cucumbers, sweet potatoes, peppers, olives/avocados, tomatoes, beats, berries, eggs, meat/fish, shrimp/crawfish with some limited fruit (apples/oranges/bananas/grapes) and even more limited grains/starches (pasta/rice/bread/potatoes/cereal) will serve you much better in the long term than eating a bunch of diet bars, shakes or powders.     - Exercise has many benefits (heart health, improved mood/energy, higher self esteem, less depression, greater strength/flexibility, better sleep, less stress/anxiety, improved immune system, stronger bones, improved cognition, fewer colds/asthma exacerbations), it also does help lose weight.  But weight loss from exercise is much less impactful than when a change in diet can achieve.  Exercise is highly encouraged, but diet change should be the primary tool used to lose weight.     "

## 2025-02-20 ENCOUNTER — PATIENT OUTREACH (OUTPATIENT)
Dept: ADMINISTRATIVE | Facility: HOSPITAL | Age: 45
End: 2025-02-20
Payer: COMMERCIAL

## 2025-02-20 NOTE — PROGRESS NOTES
Health Maintenance Due   Topic Date Due    Hepatitis C Screening  Never done    HIV Screening  Never done    High Dose Statin  Never done    Hemoglobin A1c (Diabetic Prevention Screening)  Never done    Influenza Vaccine (1) Never done    COVID-19 Vaccine (3 - 2024-25 season) 09/01/2024     Immunizations - reviewed and updated   Care Everywhere - triggered   Care Teams - updated   Outreach - Colonoscopy done 1/8/2025, uploaded to .  updated

## 2025-02-27 ENCOUNTER — RESULTS FOLLOW-UP (OUTPATIENT)
Dept: PRIMARY CARE CLINIC | Facility: CLINIC | Age: 45
End: 2025-02-27
Payer: COMMERCIAL

## 2025-02-27 DIAGNOSIS — E78.2 MIXED HYPERLIPIDEMIA: Primary | ICD-10-CM

## 2025-02-27 RX ORDER — ATORVASTATIN CALCIUM 40 MG/1
40 TABLET, FILM COATED ORAL DAILY
Qty: 90 TABLET | Refills: 1 | Status: SHIPPED | OUTPATIENT
Start: 2025-02-27

## 2025-09-02 ENCOUNTER — OFFICE VISIT (OUTPATIENT)
Dept: PRIMARY CARE CLINIC | Facility: CLINIC | Age: 45
End: 2025-09-02
Payer: COMMERCIAL

## 2025-09-02 ENCOUNTER — RESULTS FOLLOW-UP (OUTPATIENT)
Dept: PRIMARY CARE CLINIC | Facility: CLINIC | Age: 45
End: 2025-09-02

## 2025-09-02 VITALS
SYSTOLIC BLOOD PRESSURE: 130 MMHG | DIASTOLIC BLOOD PRESSURE: 80 MMHG | WEIGHT: 220.13 LBS | RESPIRATION RATE: 16 BRPM | HEART RATE: 73 BPM | OXYGEN SATURATION: 97 % | BODY MASS INDEX: 33.36 KG/M2 | HEIGHT: 68 IN | TEMPERATURE: 98 F

## 2025-09-02 DIAGNOSIS — T14.8XXA ABRASION: ICD-10-CM

## 2025-09-02 DIAGNOSIS — E78.2 MIXED HYPERLIPIDEMIA: Primary | ICD-10-CM

## 2025-09-02 PROCEDURE — 99999 PR PBB SHADOW E&M-EST. PATIENT-LVL IV: CPT | Mod: PBBFAC,,,

## 2025-09-02 RX ORDER — ATORVASTATIN CALCIUM 40 MG/1
40 TABLET, FILM COATED ORAL DAILY
Qty: 90 TABLET | Refills: 1 | Status: SHIPPED | OUTPATIENT
Start: 2025-09-02

## (undated) DEVICE — Device

## (undated) DEVICE — APPLICATOR CHLORAPREP ORN 26ML

## (undated) DEVICE — SYR 10CC LUER LOCK

## (undated) DEVICE — COVER OVERHEAD SURG LT BLUE

## (undated) DEVICE — SUT STRATAFIX 4-0 30CM PS-2

## (undated) DEVICE — GLOVE BIOGEL 7.5

## (undated) DEVICE — DRAIN PENROSE XRAY 18 X 1/4 ST

## (undated) DEVICE — NDL HYPO REG 25G X 1 1/2

## (undated) DEVICE — CONTAINER SPECIMEN STRL 4OZ

## (undated) DEVICE — SEE MEDLINE ITEM 157117

## (undated) DEVICE — SYS CLSR DERMABOND PRINEO 22CM

## (undated) DEVICE — SEE L#120831

## (undated) DEVICE — SEE MEDLINE ITEM 146417

## (undated) DEVICE — BLANKET UPPER BODY 78.7X29.9IN

## (undated) DEVICE — CLIPPER BLADE MOD 4406 (CAREF)

## (undated) DEVICE — ELECTRODE REM PLYHSV RETURN 9

## (undated) DEVICE — PACK ENDOSCOPY GENERAL

## (undated) DEVICE — SUT VICRYL PLUS 3-0 SH 18IN